# Patient Record
Sex: MALE | Race: WHITE | Employment: OTHER | URBAN - METROPOLITAN AREA
[De-identification: names, ages, dates, MRNs, and addresses within clinical notes are randomized per-mention and may not be internally consistent; named-entity substitution may affect disease eponyms.]

---

## 2019-04-23 ENCOUNTER — HOSPITAL ENCOUNTER (OUTPATIENT)
Dept: PREADMISSION TESTING | Age: 64
Discharge: HOME | End: 2019-04-27
Payer: COMMERCIAL

## 2019-04-23 VITALS
SYSTOLIC BLOOD PRESSURE: 128 MMHG | OXYGEN SATURATION: 94 % | BODY MASS INDEX: 28.12 KG/M2 | DIASTOLIC BLOOD PRESSURE: 68 MMHG | HEART RATE: 80 BPM | HEIGHT: 66 IN | WEIGHT: 175 LBS | TEMPERATURE: 98.2 F | RESPIRATION RATE: 18 BRPM

## 2019-04-23 LAB
ANION GAP SERPL CALCULATED.3IONS-SCNC: 12 MMOL/L (ref 9–17)
BUN BLDV-MCNC: 15 MG/DL (ref 8–23)
CHLORIDE BLD-SCNC: 104 MMOL/L (ref 98–107)
CO2: 24 MMOL/L (ref 20–31)
CREAT SERPL-MCNC: 0.64 MG/DL (ref 0.7–1.2)
GFR AFRICAN AMERICAN: >60 ML/MIN
GFR NON-AFRICAN AMERICAN: >60 ML/MIN
GFR SERPL CREATININE-BSD FRML MDRD: ABNORMAL ML/MIN/{1.73_M2}
GFR SERPL CREATININE-BSD FRML MDRD: ABNORMAL ML/MIN/{1.73_M2}
HCT VFR BLD CALC: 50.1 % (ref 40.7–50.3)
HEMOGLOBIN: 16.1 G/DL (ref 13–17)
INR BLD: 1
MCH RBC QN AUTO: 31.4 PG (ref 25.2–33.5)
MCHC RBC AUTO-ENTMCNC: 32.1 G/DL (ref 28.4–34.8)
MCV RBC AUTO: 97.9 FL (ref 82.6–102.9)
NRBC AUTOMATED: 0 PER 100 WBC
PARTIAL THROMBOPLASTIN TIME: 24.2 SEC (ref 20.5–30.5)
PDW BLD-RTO: 11.8 % (ref 11.8–14.4)
PLATELET # BLD: 207 K/UL (ref 138–453)
PMV BLD AUTO: 11.3 FL (ref 8.1–13.5)
POTASSIUM SERPL-SCNC: 4.1 MMOL/L (ref 3.7–5.3)
PROTHROMBIN TIME: 10.6 SEC (ref 9–12)
RBC # BLD: 5.12 M/UL (ref 4.21–5.77)
SODIUM BLD-SCNC: 140 MMOL/L (ref 135–144)
WBC # BLD: 4.5 K/UL (ref 3.5–11.3)

## 2019-04-23 PROCEDURE — 36415 COLL VENOUS BLD VENIPUNCTURE: CPT

## 2019-04-23 PROCEDURE — 87086 URINE CULTURE/COLONY COUNT: CPT

## 2019-04-23 PROCEDURE — 85027 COMPLETE CBC AUTOMATED: CPT

## 2019-04-23 PROCEDURE — 85610 PROTHROMBIN TIME: CPT

## 2019-04-23 PROCEDURE — 82565 ASSAY OF CREATININE: CPT

## 2019-04-23 PROCEDURE — 84520 ASSAY OF UREA NITROGEN: CPT

## 2019-04-23 PROCEDURE — 93005 ELECTROCARDIOGRAM TRACING: CPT

## 2019-04-23 PROCEDURE — 80051 ELECTROLYTE PANEL: CPT

## 2019-04-23 PROCEDURE — 85730 THROMBOPLASTIN TIME PARTIAL: CPT

## 2019-04-23 RX ORDER — TRAMADOL HYDROCHLORIDE 50 MG/1
50 TABLET ORAL EVERY 6 HOURS PRN
Status: ON HOLD | COMMUNITY
End: 2020-09-01 | Stop reason: HOSPADM

## 2019-04-23 RX ORDER — ACETAMINOPHEN 500 MG
1000 TABLET ORAL EVERY 6 HOURS PRN
Status: ON HOLD | COMMUNITY
End: 2020-09-01 | Stop reason: HOSPADM

## 2019-04-23 RX ORDER — CHOLECALCIFEROL (VITAMIN D3) 1250 MCG
1 CAPSULE ORAL
Status: ON HOLD | COMMUNITY
End: 2020-09-01

## 2019-04-23 RX ORDER — TAMSULOSIN HYDROCHLORIDE 0.4 MG/1
0.4 CAPSULE ORAL DAILY
COMMUNITY

## 2019-04-23 RX ORDER — SODIUM CHLORIDE, SODIUM LACTATE, POTASSIUM CHLORIDE, CALCIUM CHLORIDE 600; 310; 30; 20 MG/100ML; MG/100ML; MG/100ML; MG/100ML
1000 INJECTION, SOLUTION INTRAVENOUS CONTINUOUS
Status: CANCELLED | OUTPATIENT
Start: 2019-04-23

## 2019-04-23 RX ORDER — FERROUS SULFATE 325(65) MG
325 TABLET ORAL
COMMUNITY

## 2019-04-23 RX ORDER — BIOTIN 1 MG
5000 TABLET ORAL DAILY
COMMUNITY

## 2019-04-23 ASSESSMENT — PAIN DESCRIPTION - ORIENTATION: ORIENTATION: RIGHT

## 2019-04-23 ASSESSMENT — PAIN DESCRIPTION - PAIN TYPE: TYPE: ACUTE PAIN

## 2019-04-23 ASSESSMENT — PAIN DESCRIPTION - LOCATION: LOCATION: BACK

## 2019-04-23 ASSESSMENT — PAIN DESCRIPTION - DESCRIPTORS: DESCRIPTORS: BURNING

## 2019-04-23 ASSESSMENT — PAIN SCALES - GENERAL: PAINLEVEL_OUTOF10: 1

## 2019-04-23 ASSESSMENT — PAIN DESCRIPTION - ONSET: ONSET: ON-GOING

## 2019-04-23 ASSESSMENT — PAIN DESCRIPTION - FREQUENCY: FREQUENCY: INTERMITTENT

## 2019-04-23 ASSESSMENT — PAIN DESCRIPTION - PROGRESSION: CLINICAL_PROGRESSION: NOT CHANGED

## 2019-04-23 NOTE — H&P
History and Physical    Pt Name: Brittani Harmon  MRN: [id number]  YOB: 1955  Date of evaluation: 4/23/2019  Primary Care Physician: Neftali Enciso MD  Patient evaluated at the request of  Dr. Rika Sanz    Reason for evaluation:   Right renal stones   SUBJECTIVE:   History of Chief Complaint:      Lennie Craig is a 59 y.o. male  Who had a hx of kidney stones that   First started over 15 yrs ago   At present right flank pain with gross hematuria  That he noticed 2 months ago approx  , has drank soda x 35 yrs . Hx of 4 episodes of renal stones he reports through the years . Past Medical History      has a past medical history of Arthritis, Chronic kidney disease, DDD (degenerative disc disease), cervical, Difficulty urinating, Fracture lumbar vertebra-closed ([address] GERD (gastroesophageal reflux disease), Hyperlipidemia, Kidney stone, Palpitations, and Ureteral stone with hydronephrosis. Past Surgical History   has a past surgical history that includes Appendectomy; craniotomy; Lithotripsy; Cystoscopy (10/28/14); Carpal tunnel release (Right); hernia repair; and hernia repair (08/29/2016). Medications   Scheduled Meds:  Current Outpatient Rx   Medication Sig Dispense Refill    traMADol (ULTRAM) 50 MG tablet Take 50 mg by mouth every 6 hours as needed for Pain.       tamsulosin (FLOMAX) 0.4 MG capsule Take 0.4 mg by mouth daily      ferrous sulfate 325 (65 Fe) MG tablet Take 325 mg by mouth daily (with breakfast)      Cholecalciferol (VITAMIN D3) 42210 units CAPS Take 1 capsule by mouth      Biotin 1000 MCG TABS Take 5,000 mcg by mouth daily      acetaminophen (TYLENOL) 500 MG tablet Take 1,000 mg by mouth every 6 hours as needed for Pain      atorvastatin (LIPITOR) 10 MG tablet Take 10 mg by mouth daily      lansoprazole (PREVACID) 15 MG capsule Take 15 mg by mouth daily      Glucosamine-Chondroit-Vit C-Mn (GLUCOSAMINE 1500 COMPLEX PO) Take 1 tablet by mouth daily Not on file        Service:No         Hobbies:   Works out in the [employer]  Daily     OBJECTIVE:   VITALS:  height is 5' 6\" (1.676 m) and weight is 175 lb (79.4 kg). His oral temperature is 98.2 °F (36.8 °C). His blood pressure is 128/68 and his pulse is 80. His respiration is 18 and oxygen saturation is 94%. CONSTITUTIONAL: Alert and oriented times 3, no acute distress and cooperative to examination. friendly and pleasant , muscular athletic build     SKIN: rash No    HEENT: Head is normocephalic, atraumatic. EOMI, PERRLA    Oral air way :slightly narrow Yes    NECK: neck supple, no lymphadenopathy noted, trachea midline and straight       2+ carotid, no bruit    LUNGS: Chest expands equally bilaterally upon respiration, no accessory muscle used. Ausculation reveals no adventitious breath sounds. CARDIOVASCULAR: \"Heart sounds are normal.  Regular rate and rhythm without murmur,    ABDOMEN: Bowel sounds are present in all four quadrants      GENATALIA:Deferred. NEUROLOGIC: \"CN II-XII are grossly intact. EXTREMITIES: Pitting edema:  No,  Varicose veins: No     Dorsal pedal/posterior tibial pulses palpable: Yes         Strength:  Normal       Patient Active Problem List   Diagnosis    Flank pain    Ureteral stone with hydronephrosis    Nausea & vomiting    Umbilical hernia without obstruction and without gangrene    Obstructive uropathy    Chronic midline low back pain               IMPRESSIONS:   1.  right renal stones   2. does not have any pertinent problems on file.     [facility]  Electronically signed 4/23/2019 at 8:51 AM       Scheduled for:    Right kidney stone surgery

## 2019-04-23 NOTE — ANESTHESIA PRE-OP
Anesthesia Focused Assessment    STOP-BANG Sleep Apnea Questionnaire    Obstructive Sleep Apnea:                                                                 No     Machine used:                                                                                  No            SNORE loudly (heard through closed doors)? No      TIRED, fatigued, sleepy during daytime? No      OBSERVED stopping breathing during sleep? No      High blood PRESSURE being treated? No      BMI over 35? No  AGE over 48? Yes  NECK circumference over 16\"? No  GENDER (male)? Yes                High risk 5-8  Intermediate risk 3-4  Low risk 0-2    Total 3  High risk 5-8  Intermediate risk 3-4  Low risk 0-2      Type 1 DM:                                                                                        No    TYPE 2:                                                                                             No    If yes, insulin dependent? No    Coronary Artery Disease :                                                                No        Active smoker                                                                                   No    Drinks Alcohol                                                                                   Yes, occasional     Dentition: Dentures                                                                            No              Partial                                                                                                 No    Any loose or missing teeth                                                                 Yes, missing     Defib / AICD / Pacemaker:                                                               No    Renal Failure: No    If Yes, On dialysis?       Hx of anesthesia complications with Patient                               No    Hx of anesthesia complications with family No    Medical or cardiac clearance ordered:                                         No    Anesthesiologist called:                                                                No    GWYN Collier PA-C  Electronically signed 4/23/2019 at 8:51 AM

## 2019-04-24 LAB
CULTURE: NORMAL
EKG ATRIAL RATE: 59 BPM
EKG P AXIS: 38 DEGREES
EKG P-R INTERVAL: 156 MS
EKG Q-T INTERVAL: 416 MS
EKG QRS DURATION: 92 MS
EKG QTC CALCULATION (BAZETT): 411 MS
EKG R AXIS: 35 DEGREES
EKG T AXIS: 23 DEGREES
EKG VENTRICULAR RATE: 59 BPM
Lab: NORMAL
SPECIMEN DESCRIPTION: NORMAL

## 2019-04-30 ENCOUNTER — ANESTHESIA EVENT (OUTPATIENT)
Dept: OPERATING ROOM | Age: 64
DRG: 661 | End: 2019-04-30
Payer: COMMERCIAL

## 2019-05-01 ENCOUNTER — APPOINTMENT (OUTPATIENT)
Dept: GENERAL RADIOLOGY | Age: 64
DRG: 661 | End: 2019-05-01
Attending: UROLOGY
Payer: COMMERCIAL

## 2019-05-01 ENCOUNTER — ANESTHESIA (OUTPATIENT)
Dept: INTERVENTIONAL RADIOLOGY/VASCULAR | Age: 64
DRG: 661 | End: 2019-05-01
Payer: COMMERCIAL

## 2019-05-01 ENCOUNTER — ANESTHESIA EVENT (OUTPATIENT)
Dept: OPERATING ROOM | Age: 64
DRG: 661 | End: 2019-05-01
Payer: COMMERCIAL

## 2019-05-01 ENCOUNTER — ANESTHESIA (OUTPATIENT)
Dept: OPERATING ROOM | Age: 64
DRG: 661 | End: 2019-05-01
Payer: COMMERCIAL

## 2019-05-01 ENCOUNTER — ANESTHESIA EVENT (OUTPATIENT)
Dept: INTERVENTIONAL RADIOLOGY/VASCULAR | Age: 64
DRG: 661 | End: 2019-05-01
Payer: COMMERCIAL

## 2019-05-01 ENCOUNTER — HOSPITAL ENCOUNTER (OUTPATIENT)
Dept: INTERVENTIONAL RADIOLOGY/VASCULAR | Age: 64
Setting detail: OUTPATIENT SURGERY
Discharge: HOME | DRG: 661 | End: 2019-05-03
Attending: UROLOGY
Payer: COMMERCIAL

## 2019-05-01 ENCOUNTER — HOSPITAL ENCOUNTER (INPATIENT)
Age: 64
LOS: 1 days | Discharge: HOME | DRG: 661 | End: 2019-05-04
Attending: UROLOGY | Admitting: UROLOGY
Payer: COMMERCIAL

## 2019-05-01 VITALS
RESPIRATION RATE: 17 BRPM | DIASTOLIC BLOOD PRESSURE: 49 MMHG | OXYGEN SATURATION: 100 % | SYSTOLIC BLOOD PRESSURE: 125 MMHG

## 2019-05-01 VITALS
RESPIRATION RATE: 19 BRPM | OXYGEN SATURATION: 98 % | SYSTOLIC BLOOD PRESSURE: 154 MMHG | HEART RATE: 73 BPM | DIASTOLIC BLOOD PRESSURE: 64 MMHG | TEMPERATURE: 97.9 F

## 2019-05-01 VITALS
SYSTOLIC BLOOD PRESSURE: 99 MMHG | RESPIRATION RATE: 11 BRPM | DIASTOLIC BLOOD PRESSURE: 53 MMHG | OXYGEN SATURATION: 99 %

## 2019-05-01 VITALS — TEMPERATURE: 97.7 F | OXYGEN SATURATION: 98 % | DIASTOLIC BLOOD PRESSURE: 71 MMHG | SYSTOLIC BLOOD PRESSURE: 131 MMHG

## 2019-05-01 DIAGNOSIS — N20.0 KIDNEY STONES: ICD-10-CM

## 2019-05-01 DIAGNOSIS — G89.18 POST-OP PAIN: Primary | ICD-10-CM

## 2019-05-01 LAB
HCT VFR BLD CALC: 45.1 % (ref 40.7–50.3)
HEMOGLOBIN: 15.2 G/DL (ref 13–17)
MCH RBC QN AUTO: 32.7 PG (ref 25.2–33.5)
MCHC RBC AUTO-ENTMCNC: 33.7 G/DL (ref 28.4–34.8)
MCV RBC AUTO: 97 FL (ref 82.6–102.9)
NRBC AUTOMATED: 0 PER 100 WBC
PDW BLD-RTO: 11.4 % (ref 11.8–14.4)
PLATELET # BLD: 204 K/UL (ref 138–453)
PMV BLD AUTO: 10.8 FL (ref 8.1–13.5)
RBC # BLD: 4.65 M/UL (ref 4.21–5.77)
WBC # BLD: 10.8 K/UL (ref 3.5–11.3)

## 2019-05-01 PROCEDURE — 6360000002 HC RX W HCPCS

## 2019-05-01 PROCEDURE — 0TC03ZZ EXTIRPATION OF MATTER FROM RIGHT KIDNEY, PERCUTANEOUS APPROACH: ICD-10-PCS | Performed by: UROLOGY

## 2019-05-01 PROCEDURE — 6370000000 HC RX 637 (ALT 250 FOR IP): Performed by: STUDENT IN AN ORGANIZED HEALTH CARE EDUCATION/TRAINING PROGRAM

## 2019-05-01 PROCEDURE — 3700000000 HC ANESTHESIA ATTENDED CARE: Performed by: UROLOGY

## 2019-05-01 PROCEDURE — 6360000002 HC RX W HCPCS: Performed by: RADIOLOGY

## 2019-05-01 PROCEDURE — 6360000002 HC RX W HCPCS: Performed by: ANESTHESIOLOGY

## 2019-05-01 PROCEDURE — 50433 PLMT NEPHROURETERAL CATHETER: CPT | Performed by: RADIOLOGY

## 2019-05-01 PROCEDURE — 2709999900 HC NON-CHARGEABLE SUPPLY

## 2019-05-01 PROCEDURE — 3700000001 HC ADD 15 MINUTES (ANESTHESIA)

## 2019-05-01 PROCEDURE — C1769 GUIDE WIRE: HCPCS | Performed by: UROLOGY

## 2019-05-01 PROCEDURE — G0378 HOSPITAL OBSERVATION PER HR: HCPCS

## 2019-05-01 PROCEDURE — 3600000004 HC SURGERY LEVEL 4 BASE: Performed by: UROLOGY

## 2019-05-01 PROCEDURE — 6360000002 HC RX W HCPCS: Performed by: NURSE ANESTHETIST, CERTIFIED REGISTERED

## 2019-05-01 PROCEDURE — 3600000002 HC SURGERY LEVEL 2 BASE: Performed by: UROLOGY

## 2019-05-01 PROCEDURE — 3600000012 HC SURGERY LEVEL 2 ADDTL 15MIN: Performed by: UROLOGY

## 2019-05-01 PROCEDURE — 7100000000 HC PACU RECOVERY - FIRST 15 MIN: Performed by: UROLOGY

## 2019-05-01 PROCEDURE — 6360000004 HC RX CONTRAST MEDICATION: Performed by: UROLOGY

## 2019-05-01 PROCEDURE — 3600000014 HC SURGERY LEVEL 4 ADDTL 15MIN: Performed by: UROLOGY

## 2019-05-01 PROCEDURE — 2500000003 HC RX 250 WO HCPCS: Performed by: NURSE ANESTHETIST, CERTIFIED REGISTERED

## 2019-05-01 PROCEDURE — 2580000003 HC RX 258: Performed by: ANESTHESIOLOGY

## 2019-05-01 PROCEDURE — 7100000010 HC PHASE II RECOVERY - FIRST 15 MIN

## 2019-05-01 PROCEDURE — 6360000002 HC RX W HCPCS: Performed by: PHYSICIAN ASSISTANT

## 2019-05-01 PROCEDURE — 2500000003 HC RX 250 WO HCPCS

## 2019-05-01 PROCEDURE — 74018 RADEX ABDOMEN 1 VIEW: CPT

## 2019-05-01 PROCEDURE — 2580000003 HC RX 258: Performed by: UROLOGY

## 2019-05-01 PROCEDURE — 0TL68DZ OCCLUSION OF RIGHT URETER WITH INTRALUMINAL DEVICE, VIA NATURAL OR ARTIFICIAL OPENING ENDOSCOPIC: ICD-10-PCS | Performed by: UROLOGY

## 2019-05-01 PROCEDURE — 3700000001 HC ADD 15 MINUTES (ANESTHESIA): Performed by: UROLOGY

## 2019-05-01 PROCEDURE — C1769 GUIDE WIRE: HCPCS

## 2019-05-01 PROCEDURE — C1726 CATH, BAL DIL, NON-VASCULAR: HCPCS | Performed by: UROLOGY

## 2019-05-01 PROCEDURE — 2580000003 HC RX 258

## 2019-05-01 PROCEDURE — 2709999900 HC NON-CHARGEABLE SUPPLY: Performed by: UROLOGY

## 2019-05-01 PROCEDURE — 6360000002 HC RX W HCPCS: Performed by: STUDENT IN AN ORGANIZED HEALTH CARE EDUCATION/TRAINING PROGRAM

## 2019-05-01 PROCEDURE — 2580000003 HC RX 258: Performed by: PHYSICIAN ASSISTANT

## 2019-05-01 PROCEDURE — BT1D0ZZ FLUOROSCOPY OF RIGHT KIDNEY, URETER AND BLADDER USING HIGH OSMOLAR CONTRAST: ICD-10-PCS | Performed by: UROLOGY

## 2019-05-01 PROCEDURE — 3700000000 HC ANESTHESIA ATTENDED CARE

## 2019-05-01 PROCEDURE — 7100000001 HC PACU RECOVERY - ADDTL 15 MIN: Performed by: UROLOGY

## 2019-05-01 PROCEDURE — C2628 CATHETER, OCCLUSION: HCPCS | Performed by: UROLOGY

## 2019-05-01 PROCEDURE — 7100000011 HC PHASE II RECOVERY - ADDTL 15 MIN

## 2019-05-01 PROCEDURE — 85027 COMPLETE CBC AUTOMATED: CPT

## 2019-05-01 PROCEDURE — 82365 CALCULUS SPECTROSCOPY: CPT

## 2019-05-01 RX ORDER — MIDAZOLAM HYDROCHLORIDE 1 MG/ML
INJECTION INTRAMUSCULAR; INTRAVENOUS PRN
Status: DISCONTINUED | OUTPATIENT
Start: 2019-05-01 | End: 2019-05-01 | Stop reason: SDUPTHER

## 2019-05-01 RX ORDER — OXYCODONE HYDROCHLORIDE AND ACETAMINOPHEN 5; 325 MG/1; MG/1
1 TABLET ORAL EVERY 4 HOURS PRN
Status: DISCONTINUED | OUTPATIENT
Start: 2019-05-01 | End: 2019-05-01

## 2019-05-01 RX ORDER — SODIUM CHLORIDE 0.9 % (FLUSH) 0.9 %
10 SYRINGE (ML) INJECTION PRN
Status: DISCONTINUED | OUTPATIENT
Start: 2019-05-01 | End: 2019-05-04 | Stop reason: HOSPADM

## 2019-05-01 RX ORDER — PANTOPRAZOLE SODIUM 20 MG/1
20 TABLET, DELAYED RELEASE ORAL
Status: DISCONTINUED | OUTPATIENT
Start: 2019-05-02 | End: 2019-05-04 | Stop reason: HOSPADM

## 2019-05-01 RX ORDER — ONDANSETRON 2 MG/ML
4 INJECTION INTRAMUSCULAR; INTRAVENOUS EVERY 6 HOURS PRN
Status: DISCONTINUED | OUTPATIENT
Start: 2019-05-01 | End: 2019-05-04 | Stop reason: HOSPADM

## 2019-05-01 RX ORDER — PROPOFOL 10 MG/ML
INJECTION, EMULSION INTRAVENOUS CONTINUOUS PRN
Status: DISCONTINUED | OUTPATIENT
Start: 2019-05-01 | End: 2019-05-01 | Stop reason: SDUPTHER

## 2019-05-01 RX ORDER — MAGNESIUM HYDROXIDE 1200 MG/15ML
LIQUID ORAL PRN
Status: DISCONTINUED | OUTPATIENT
Start: 2019-05-01 | End: 2019-05-01 | Stop reason: ALTCHOICE

## 2019-05-01 RX ORDER — MIDAZOLAM HYDROCHLORIDE 1 MG/ML
1 INJECTION INTRAMUSCULAR; INTRAVENOUS ONCE
Status: COMPLETED | OUTPATIENT
Start: 2019-05-01 | End: 2019-05-01

## 2019-05-01 RX ORDER — LABETALOL HYDROCHLORIDE 5 MG/ML
5 INJECTION, SOLUTION INTRAVENOUS EVERY 10 MIN PRN
Status: DISCONTINUED | OUTPATIENT
Start: 2019-05-01 | End: 2019-05-01 | Stop reason: HOSPADM

## 2019-05-01 RX ORDER — DOCUSATE SODIUM 100 MG/1
100 CAPSULE, LIQUID FILLED ORAL 2 TIMES DAILY PRN
Status: DISCONTINUED | OUTPATIENT
Start: 2019-05-01 | End: 2019-05-02

## 2019-05-01 RX ORDER — MORPHINE SULFATE 2 MG/ML
2 INJECTION, SOLUTION INTRAMUSCULAR; INTRAVENOUS EVERY 4 HOURS PRN
Status: DISCONTINUED | OUTPATIENT
Start: 2019-05-01 | End: 2019-05-01

## 2019-05-01 RX ORDER — HYDROCODONE BITARTRATE AND ACETAMINOPHEN 5; 325 MG/1; MG/1
2 TABLET ORAL EVERY 6 HOURS PRN
Status: DISCONTINUED | OUTPATIENT
Start: 2019-05-01 | End: 2019-05-03

## 2019-05-01 RX ORDER — SODIUM CHLORIDE 9 MG/ML
INJECTION, SOLUTION INTRAVENOUS CONTINUOUS
Status: DISCONTINUED | OUTPATIENT
Start: 2019-05-01 | End: 2019-05-04 | Stop reason: HOSPADM

## 2019-05-01 RX ORDER — FENTANYL CITRATE 50 UG/ML
50 INJECTION, SOLUTION INTRAMUSCULAR; INTRAVENOUS ONCE
Status: COMPLETED | OUTPATIENT
Start: 2019-05-01 | End: 2019-05-01

## 2019-05-01 RX ORDER — MIDAZOLAM HYDROCHLORIDE 1 MG/ML
INJECTION INTRAMUSCULAR; INTRAVENOUS
Status: DISCONTINUED
Start: 2019-05-01 | End: 2019-05-01

## 2019-05-01 RX ORDER — POLYETHYLENE GLYCOL 3350 17 G/17G
17 POWDER, FOR SOLUTION ORAL DAILY PRN
Status: DISCONTINUED | OUTPATIENT
Start: 2019-05-01 | End: 2019-05-04 | Stop reason: HOSPADM

## 2019-05-01 RX ORDER — LIDOCAINE HYDROCHLORIDE 10 MG/ML
INJECTION, SOLUTION EPIDURAL; INFILTRATION; INTRACAUDAL; PERINEURAL PRN
Status: DISCONTINUED | OUTPATIENT
Start: 2019-05-01 | End: 2019-05-01 | Stop reason: SDUPTHER

## 2019-05-01 RX ORDER — FENTANYL CITRATE 50 UG/ML
INJECTION, SOLUTION INTRAMUSCULAR; INTRAVENOUS PRN
Status: DISCONTINUED | OUTPATIENT
Start: 2019-05-01 | End: 2019-05-01 | Stop reason: SDUPTHER

## 2019-05-01 RX ORDER — CIPROFLOXACIN 2 MG/ML
400 INJECTION, SOLUTION INTRAVENOUS ONCE
Status: COMPLETED | OUTPATIENT
Start: 2019-05-01 | End: 2019-05-01

## 2019-05-01 RX ORDER — MORPHINE SULFATE 2 MG/ML
2 INJECTION, SOLUTION INTRAMUSCULAR; INTRAVENOUS EVERY 5 MIN PRN
Status: DISCONTINUED | OUTPATIENT
Start: 2019-05-01 | End: 2019-05-01 | Stop reason: HOSPADM

## 2019-05-01 RX ORDER — ROCURONIUM BROMIDE 10 MG/ML
INJECTION, SOLUTION INTRAVENOUS PRN
Status: DISCONTINUED | OUTPATIENT
Start: 2019-05-01 | End: 2019-05-01 | Stop reason: SDUPTHER

## 2019-05-01 RX ORDER — MORPHINE SULFATE 4 MG/ML
4 INJECTION, SOLUTION INTRAMUSCULAR; INTRAVENOUS
Status: DISCONTINUED | OUTPATIENT
Start: 2019-05-01 | End: 2019-05-01

## 2019-05-01 RX ORDER — FENTANYL CITRATE 50 UG/ML
50 INJECTION, SOLUTION INTRAMUSCULAR; INTRAVENOUS EVERY 5 MIN PRN
Status: COMPLETED | OUTPATIENT
Start: 2019-05-01 | End: 2019-05-01

## 2019-05-01 RX ORDER — SODIUM CHLORIDE 9 MG/ML
INJECTION, SOLUTION INTRAVENOUS CONTINUOUS
Status: DISCONTINUED | OUTPATIENT
Start: 2019-05-01 | End: 2019-05-01

## 2019-05-01 RX ORDER — ONDANSETRON 2 MG/ML
4 INJECTION INTRAMUSCULAR; INTRAVENOUS
Status: COMPLETED | OUTPATIENT
Start: 2019-05-01 | End: 2019-05-01

## 2019-05-01 RX ORDER — MAGNESIUM HYDROXIDE 1200 MG/15ML
LIQUID ORAL CONTINUOUS PRN
Status: COMPLETED | OUTPATIENT
Start: 2019-05-01 | End: 2019-05-01

## 2019-05-01 RX ORDER — FENTANYL CITRATE 50 UG/ML
25 INJECTION, SOLUTION INTRAMUSCULAR; INTRAVENOUS
Status: DISCONTINUED | OUTPATIENT
Start: 2019-05-01 | End: 2019-05-02

## 2019-05-01 RX ORDER — FENTANYL CITRATE 50 UG/ML
25 INJECTION, SOLUTION INTRAMUSCULAR; INTRAVENOUS EVERY 5 MIN PRN
Status: COMPLETED | OUTPATIENT
Start: 2019-05-01 | End: 2019-05-01

## 2019-05-01 RX ORDER — OXYCODONE HYDROCHLORIDE AND ACETAMINOPHEN 5; 325 MG/1; MG/1
2 TABLET ORAL EVERY 4 HOURS PRN
Status: DISCONTINUED | OUTPATIENT
Start: 2019-05-01 | End: 2019-05-01

## 2019-05-01 RX ORDER — ONDANSETRON 2 MG/ML
4 INJECTION INTRAMUSCULAR; INTRAVENOUS
Status: DISCONTINUED | OUTPATIENT
Start: 2019-05-01 | End: 2019-05-01 | Stop reason: HOSPADM

## 2019-05-01 RX ORDER — PROPOFOL 10 MG/ML
INJECTION, EMULSION INTRAVENOUS PRN
Status: DISCONTINUED | OUTPATIENT
Start: 2019-05-01 | End: 2019-05-01 | Stop reason: SDUPTHER

## 2019-05-01 RX ORDER — ONDANSETRON 2 MG/ML
INJECTION INTRAMUSCULAR; INTRAVENOUS PRN
Status: DISCONTINUED | OUTPATIENT
Start: 2019-05-01 | End: 2019-05-01 | Stop reason: SDUPTHER

## 2019-05-01 RX ORDER — SODIUM CHLORIDE 0.9 % (FLUSH) 0.9 %
10 SYRINGE (ML) INJECTION EVERY 12 HOURS SCHEDULED
Status: DISCONTINUED | OUTPATIENT
Start: 2019-05-01 | End: 2019-05-04 | Stop reason: HOSPADM

## 2019-05-01 RX ORDER — DEXAMETHASONE SODIUM PHOSPHATE 10 MG/ML
INJECTION INTRAMUSCULAR; INTRAVENOUS PRN
Status: DISCONTINUED | OUTPATIENT
Start: 2019-05-01 | End: 2019-05-01 | Stop reason: SDUPTHER

## 2019-05-01 RX ORDER — MEPERIDINE HYDROCHLORIDE 50 MG/ML
12.5 INJECTION INTRAMUSCULAR; INTRAVENOUS; SUBCUTANEOUS EVERY 5 MIN PRN
Status: DISCONTINUED | OUTPATIENT
Start: 2019-05-01 | End: 2019-05-04 | Stop reason: HOSPADM

## 2019-05-01 RX ORDER — NEOSTIGMINE METHYLSULFATE 5 MG/5 ML
SYRINGE (ML) INTRAVENOUS PRN
Status: DISCONTINUED | OUTPATIENT
Start: 2019-05-01 | End: 2019-05-01 | Stop reason: SDUPTHER

## 2019-05-01 RX ORDER — MIDAZOLAM HYDROCHLORIDE 1 MG/ML
1 INJECTION INTRAMUSCULAR; INTRAVENOUS
Status: DISPENSED | OUTPATIENT
Start: 2019-05-01 | End: 2019-05-01

## 2019-05-01 RX ORDER — GLYCOPYRROLATE 1 MG/5 ML
SYRINGE (ML) INTRAVENOUS PRN
Status: DISCONTINUED | OUTPATIENT
Start: 2019-05-01 | End: 2019-05-01 | Stop reason: SDUPTHER

## 2019-05-01 RX ORDER — ATORVASTATIN CALCIUM 10 MG/1
10 TABLET, FILM COATED ORAL DAILY
Status: DISCONTINUED | OUTPATIENT
Start: 2019-05-01 | End: 2019-05-02

## 2019-05-01 RX ORDER — ATROPA BELLADONNA AND OPIUM 16.2; 3 MG/1; MG/1
30 SUPPOSITORY RECTAL EVERY 8 HOURS PRN
Status: DISCONTINUED | OUTPATIENT
Start: 2019-05-01 | End: 2019-05-04 | Stop reason: HOSPADM

## 2019-05-01 RX ORDER — HYDROCODONE BITARTRATE AND ACETAMINOPHEN 5; 325 MG/1; MG/1
1 TABLET ORAL EVERY 6 HOURS PRN
Status: DISCONTINUED | OUTPATIENT
Start: 2019-05-01 | End: 2019-05-03

## 2019-05-01 RX ORDER — SODIUM CHLORIDE, SODIUM LACTATE, POTASSIUM CHLORIDE, CALCIUM CHLORIDE 600; 310; 30; 20 MG/100ML; MG/100ML; MG/100ML; MG/100ML
1000 INJECTION, SOLUTION INTRAVENOUS CONTINUOUS
Status: DISCONTINUED | OUTPATIENT
Start: 2019-05-01 | End: 2019-05-01

## 2019-05-01 RX ORDER — FENTANYL CITRATE 50 UG/ML
50 INJECTION, SOLUTION INTRAMUSCULAR; INTRAVENOUS EVERY 5 MIN PRN
Status: DISCONTINUED | OUTPATIENT
Start: 2019-05-01 | End: 2019-05-01 | Stop reason: SDUPTHER

## 2019-05-01 RX ORDER — SENNA PLUS 8.6 MG/1
1 TABLET ORAL NIGHTLY
Status: DISCONTINUED | OUTPATIENT
Start: 2019-05-01 | End: 2019-05-04 | Stop reason: HOSPADM

## 2019-05-01 RX ORDER — TAMSULOSIN HYDROCHLORIDE 0.4 MG/1
0.4 CAPSULE ORAL DAILY
Status: DISCONTINUED | OUTPATIENT
Start: 2019-05-01 | End: 2019-05-02

## 2019-05-01 RX ORDER — ACETAMINOPHEN 325 MG/1
650 TABLET ORAL EVERY 4 HOURS PRN
Status: DISCONTINUED | OUTPATIENT
Start: 2019-05-01 | End: 2019-05-04 | Stop reason: HOSPADM

## 2019-05-01 RX ORDER — LIDOCAINE HYDROCHLORIDE 10 MG/ML
INJECTION, SOLUTION INFILTRATION; PERINEURAL PRN
Status: DISCONTINUED | OUTPATIENT
Start: 2019-05-01 | End: 2019-05-01 | Stop reason: SDUPTHER

## 2019-05-01 RX ORDER — SODIUM CHLORIDE, SODIUM LACTATE, POTASSIUM CHLORIDE, CALCIUM CHLORIDE 600; 310; 30; 20 MG/100ML; MG/100ML; MG/100ML; MG/100ML
INJECTION, SOLUTION INTRAVENOUS CONTINUOUS PRN
Status: DISCONTINUED | OUTPATIENT
Start: 2019-05-01 | End: 2019-05-01 | Stop reason: SDUPTHER

## 2019-05-01 RX ADMIN — FENTANYL CITRATE 50 MCG: 50 INJECTION, SOLUTION INTRAMUSCULAR; INTRAVENOUS at 10:38

## 2019-05-01 RX ADMIN — SODIUM CHLORIDE: 9 INJECTION, SOLUTION INTRAVENOUS at 18:58

## 2019-05-01 RX ADMIN — Medication 2 G: at 22:02

## 2019-05-01 RX ADMIN — PROPOFOL 200 MG: 10 INJECTION, EMULSION INTRAVENOUS at 14:36

## 2019-05-01 RX ADMIN — ROCURONIUM BROMIDE 50 MG: 10 INJECTION INTRAVENOUS at 14:36

## 2019-05-01 RX ADMIN — FENTANYL CITRATE 50 MCG: 50 INJECTION, SOLUTION INTRAMUSCULAR; INTRAVENOUS at 11:35

## 2019-05-01 RX ADMIN — ONDANSETRON 4 MG: 2 INJECTION, SOLUTION INTRAMUSCULAR; INTRAVENOUS at 15:33

## 2019-05-01 RX ADMIN — SODIUM CHLORIDE, POTASSIUM CHLORIDE, SODIUM LACTATE AND CALCIUM CHLORIDE 1000 ML: 600; 310; 30; 20 INJECTION, SOLUTION INTRAVENOUS at 07:48

## 2019-05-01 RX ADMIN — FENTANYL CITRATE 50 MCG: 50 INJECTION, SOLUTION INTRAMUSCULAR; INTRAVENOUS at 13:15

## 2019-05-01 RX ADMIN — FENTANYL CITRATE 100 MCG: 50 INJECTION, SOLUTION INTRAMUSCULAR; INTRAVENOUS at 14:36

## 2019-05-01 RX ADMIN — MIDAZOLAM HYDROCHLORIDE 1 MG: 1 INJECTION, SOLUTION INTRAMUSCULAR; INTRAVENOUS at 14:16

## 2019-05-01 RX ADMIN — LIDOCAINE HYDROCHLORIDE 50 MG: 10 INJECTION, SOLUTION INFILTRATION; PERINEURAL at 09:02

## 2019-05-01 RX ADMIN — MIDAZOLAM HYDROCHLORIDE 1 MG: 1 INJECTION, SOLUTION INTRAMUSCULAR; INTRAVENOUS at 11:34

## 2019-05-01 RX ADMIN — FENTANYL CITRATE 50 MCG: 50 INJECTION INTRAMUSCULAR; INTRAVENOUS at 09:01

## 2019-05-01 RX ADMIN — Medication 2 G: at 09:12

## 2019-05-01 RX ADMIN — MIDAZOLAM HYDROCHLORIDE 2 MG: 1 INJECTION, SOLUTION INTRAMUSCULAR; INTRAVENOUS at 08:57

## 2019-05-01 RX ADMIN — PROPOFOL 30 MG: 10 INJECTION, EMULSION INTRAVENOUS at 09:47

## 2019-05-01 RX ADMIN — FENTANYL CITRATE 25 MCG: 50 INJECTION, SOLUTION INTRAMUSCULAR; INTRAVENOUS at 16:51

## 2019-05-01 RX ADMIN — PROPOFOL 150 MCG/KG/MIN: 10 INJECTION, EMULSION INTRAVENOUS at 09:02

## 2019-05-01 RX ADMIN — ONDANSETRON 4 MG: 2 INJECTION INTRAMUSCULAR; INTRAVENOUS at 10:37

## 2019-05-01 RX ADMIN — PROPOFOL 80 MG: 10 INJECTION, EMULSION INTRAVENOUS at 09:02

## 2019-05-01 RX ADMIN — HYDROMORPHONE HYDROCHLORIDE 0.5 MG: 1 INJECTION, SOLUTION INTRAMUSCULAR; INTRAVENOUS; SUBCUTANEOUS at 17:32

## 2019-05-01 RX ADMIN — MIDAZOLAM HYDROCHLORIDE 1 MG: 1 INJECTION, SOLUTION INTRAMUSCULAR; INTRAVENOUS at 13:10

## 2019-05-01 RX ADMIN — FENTANYL CITRATE 25 MCG: 50 INJECTION, SOLUTION INTRAMUSCULAR; INTRAVENOUS at 17:00

## 2019-05-01 RX ADMIN — FENTANYL CITRATE 25 MCG: 50 INJECTION, SOLUTION INTRAMUSCULAR; INTRAVENOUS at 11:10

## 2019-05-01 RX ADMIN — FENTANYL CITRATE 150 MCG: 50 INJECTION, SOLUTION INTRAMUSCULAR; INTRAVENOUS at 14:40

## 2019-05-01 RX ADMIN — FENTANYL CITRATE 50 MCG: 50 INJECTION, SOLUTION INTRAMUSCULAR; INTRAVENOUS at 14:20

## 2019-05-01 RX ADMIN — FENTANYL CITRATE 50 MCG: 50 INJECTION, SOLUTION INTRAMUSCULAR; INTRAVENOUS at 10:46

## 2019-05-01 RX ADMIN — FENTANYL CITRATE 50 MCG: 50 INJECTION, SOLUTION INTRAMUSCULAR; INTRAVENOUS at 16:31

## 2019-05-01 RX ADMIN — Medication 0.4 MG: at 15:33

## 2019-05-01 RX ADMIN — HYDROMORPHONE HYDROCHLORIDE 0.5 MG: 1 INJECTION, SOLUTION INTRAMUSCULAR; INTRAVENOUS; SUBCUTANEOUS at 17:26

## 2019-05-01 RX ADMIN — FENTANYL CITRATE 50 MCG: 50 INJECTION INTRAMUSCULAR; INTRAVENOUS at 09:50

## 2019-05-01 RX ADMIN — FENTANYL CITRATE 25 MCG: 50 INJECTION, SOLUTION INTRAMUSCULAR; INTRAVENOUS at 11:00

## 2019-05-01 RX ADMIN — DEXAMETHASONE SODIUM PHOSPHATE 10 MG: 10 INJECTION INTRAMUSCULAR; INTRAVENOUS at 14:58

## 2019-05-01 RX ADMIN — FENTANYL CITRATE 50 MCG: 50 INJECTION, SOLUTION INTRAMUSCULAR; INTRAVENOUS at 12:08

## 2019-05-01 RX ADMIN — SODIUM CHLORIDE, POTASSIUM CHLORIDE, SODIUM LACTATE AND CALCIUM CHLORIDE: 600; 310; 30; 20 INJECTION, SOLUTION INTRAVENOUS at 08:57

## 2019-05-01 RX ADMIN — HYDROMORPHONE HYDROCHLORIDE 0.5 MG: 1 INJECTION, SOLUTION INTRAMUSCULAR; INTRAVENOUS; SUBCUTANEOUS at 17:20

## 2019-05-01 RX ADMIN — ONDANSETRON 4 MG: 2 INJECTION INTRAMUSCULAR; INTRAVENOUS at 19:03

## 2019-05-01 RX ADMIN — PROPOFOL 100 MCG/KG/MIN: 10 INJECTION, EMULSION INTRAVENOUS at 09:41

## 2019-05-01 RX ADMIN — CIPROFLOXACIN 400 MG: 2 INJECTION, SOLUTION INTRAVENOUS at 14:45

## 2019-05-01 RX ADMIN — FENTANYL CITRATE 50 MCG: 50 INJECTION, SOLUTION INTRAMUSCULAR; INTRAVENOUS at 14:10

## 2019-05-01 RX ADMIN — IOTHALAMATE MEGLUMINE 10 ML: 430 INJECTION INTRAVASCULAR at 10:37

## 2019-05-01 RX ADMIN — FENTANYL CITRATE 25 MCG: 50 INJECTION, SOLUTION INTRAMUSCULAR; INTRAVENOUS at 11:15

## 2019-05-01 RX ADMIN — FENTANYL CITRATE 50 MCG: 50 INJECTION, SOLUTION INTRAMUSCULAR; INTRAVENOUS at 10:51

## 2019-05-01 RX ADMIN — LIDOCAINE HYDROCHLORIDE 50 MG: 10 INJECTION, SOLUTION EPIDURAL; INFILTRATION; INTRACAUDAL at 09:42

## 2019-05-01 RX ADMIN — FENTANYL CITRATE 25 MCG: 50 INJECTION, SOLUTION INTRAMUSCULAR; INTRAVENOUS at 17:05

## 2019-05-01 RX ADMIN — FENTANYL CITRATE 25 MCG: 50 INJECTION, SOLUTION INTRAMUSCULAR; INTRAVENOUS at 18:58

## 2019-05-01 RX ADMIN — FENTANYL CITRATE 25 MCG: 50 INJECTION, SOLUTION INTRAMUSCULAR; INTRAVENOUS at 11:05

## 2019-05-01 RX ADMIN — FENTANYL CITRATE 50 MCG: 50 INJECTION, SOLUTION INTRAMUSCULAR; INTRAVENOUS at 16:26

## 2019-05-01 RX ADMIN — FENTANYL CITRATE 25 MCG: 50 INJECTION, SOLUTION INTRAMUSCULAR; INTRAVENOUS at 16:46

## 2019-05-01 RX ADMIN — FENTANYL CITRATE 50 MCG: 50 INJECTION, SOLUTION INTRAMUSCULAR; INTRAVENOUS at 10:33

## 2019-05-01 RX ADMIN — Medication 3 MG: at 15:33

## 2019-05-01 RX ADMIN — SODIUM CHLORIDE, POTASSIUM CHLORIDE, SODIUM LACTATE AND CALCIUM CHLORIDE: 600; 310; 30; 20 INJECTION, SOLUTION INTRAVENOUS at 14:23

## 2019-05-01 RX ADMIN — Medication 0.2 MG: at 09:45

## 2019-05-01 RX ADMIN — FENTANYL CITRATE 50 MCG: 50 INJECTION INTRAMUSCULAR; INTRAVENOUS at 09:12

## 2019-05-01 RX ADMIN — HYDROCODONE BITARTRATE AND ACETAMINOPHEN 2 TABLET: 5; 325 TABLET ORAL at 20:42

## 2019-05-01 ASSESSMENT — PAIN SCALES - GENERAL
PAINLEVEL_OUTOF10: 6
PAINLEVEL_OUTOF10: 8
PAINLEVEL_OUTOF10: 6
PAINLEVEL_OUTOF10: 6
PAINLEVEL_OUTOF10: 7
PAINLEVEL_OUTOF10: 7
PAINLEVEL_OUTOF10: 6
PAINLEVEL_OUTOF10: 7
PAINLEVEL_OUTOF10: 6
PAINLEVEL_OUTOF10: 7
PAINLEVEL_OUTOF10: 6
PAINLEVEL_OUTOF10: 6
PAINLEVEL_OUTOF10: 7
PAINLEVEL_OUTOF10: 8
PAINLEVEL_OUTOF10: 5
PAINLEVEL_OUTOF10: 7
PAINLEVEL_OUTOF10: 5
PAINLEVEL_OUTOF10: 5
PAINLEVEL_OUTOF10: 8
PAINLEVEL_OUTOF10: 8
PAINLEVEL_OUTOF10: 7
PAINLEVEL_OUTOF10: 6
PAINLEVEL_OUTOF10: 5
PAINLEVEL_OUTOF10: 5

## 2019-05-01 ASSESSMENT — PULMONARY FUNCTION TESTS
PIF_VALUE: 5
PIF_VALUE: 1
PIF_VALUE: 21
PIF_VALUE: 18
PIF_VALUE: 1
PIF_VALUE: 0
PIF_VALUE: 19
PIF_VALUE: 1
PIF_VALUE: 1
PIF_VALUE: 21
PIF_VALUE: 0
PIF_VALUE: 22
PIF_VALUE: 21
PIF_VALUE: 33
PIF_VALUE: 1
PIF_VALUE: 17
PIF_VALUE: 0
PIF_VALUE: 1
PIF_VALUE: 18
PIF_VALUE: 17
PIF_VALUE: 0
PIF_VALUE: 1
PIF_VALUE: 21
PIF_VALUE: 1
PIF_VALUE: 1
PIF_VALUE: 15
PIF_VALUE: 1
PIF_VALUE: 0
PIF_VALUE: 21
PIF_VALUE: 1
PIF_VALUE: 0
PIF_VALUE: 1
PIF_VALUE: 18
PIF_VALUE: 21
PIF_VALUE: 0
PIF_VALUE: 16
PIF_VALUE: 3
PIF_VALUE: 1
PIF_VALUE: 18
PIF_VALUE: 1
PIF_VALUE: 21
PIF_VALUE: 29
PIF_VALUE: 17
PIF_VALUE: 1
PIF_VALUE: 0
PIF_VALUE: 1
PIF_VALUE: 26
PIF_VALUE: 1
PIF_VALUE: 21
PIF_VALUE: 16
PIF_VALUE: 0
PIF_VALUE: 1
PIF_VALUE: 22
PIF_VALUE: 21
PIF_VALUE: 0
PIF_VALUE: 1
PIF_VALUE: 6
PIF_VALUE: 13
PIF_VALUE: 1
PIF_VALUE: 3
PIF_VALUE: 2
PIF_VALUE: 1
PIF_VALUE: 2
PIF_VALUE: 2
PIF_VALUE: 21
PIF_VALUE: 17
PIF_VALUE: 1
PIF_VALUE: 2
PIF_VALUE: 1
PIF_VALUE: 21
PIF_VALUE: 1
PIF_VALUE: 13
PIF_VALUE: 21
PIF_VALUE: 0
PIF_VALUE: 1
PIF_VALUE: 1
PIF_VALUE: 0
PIF_VALUE: 1
PIF_VALUE: 3
PIF_VALUE: 1
PIF_VALUE: 17
PIF_VALUE: 18
PIF_VALUE: 0
PIF_VALUE: 1
PIF_VALUE: 0
PIF_VALUE: 18
PIF_VALUE: 1
PIF_VALUE: 1
PIF_VALUE: 17
PIF_VALUE: 1
PIF_VALUE: 1
PIF_VALUE: 17
PIF_VALUE: 1
PIF_VALUE: 0
PIF_VALUE: 1
PIF_VALUE: 17
PIF_VALUE: 13
PIF_VALUE: 2
PIF_VALUE: 1
PIF_VALUE: 22
PIF_VALUE: 21
PIF_VALUE: 1
PIF_VALUE: 30
PIF_VALUE: 17
PIF_VALUE: 13
PIF_VALUE: 21
PIF_VALUE: 4
PIF_VALUE: 21
PIF_VALUE: 17
PIF_VALUE: 17
PIF_VALUE: 2
PIF_VALUE: 26
PIF_VALUE: 18
PIF_VALUE: 17
PIF_VALUE: 17
PIF_VALUE: 16
PIF_VALUE: 21
PIF_VALUE: 21
PIF_VALUE: 1
PIF_VALUE: 1
PIF_VALUE: 21
PIF_VALUE: 1
PIF_VALUE: 1
PIF_VALUE: 17
PIF_VALUE: 0
PIF_VALUE: 21
PIF_VALUE: 21
PIF_VALUE: 0
PIF_VALUE: 21
PIF_VALUE: 2
PIF_VALUE: 1
PIF_VALUE: 1
PIF_VALUE: 22
PIF_VALUE: 17
PIF_VALUE: 1
PIF_VALUE: 21
PIF_VALUE: 22
PIF_VALUE: 1

## 2019-05-01 ASSESSMENT — PAIN DESCRIPTION - PROGRESSION
CLINICAL_PROGRESSION: RAPIDLY WORSENING
CLINICAL_PROGRESSION: GRADUALLY WORSENING
CLINICAL_PROGRESSION: GRADUALLY IMPROVING
CLINICAL_PROGRESSION: NOT CHANGED
CLINICAL_PROGRESSION: GRADUALLY WORSENING
CLINICAL_PROGRESSION: RAPIDLY IMPROVING
CLINICAL_PROGRESSION: RAPIDLY WORSENING
CLINICAL_PROGRESSION: NOT CHANGED

## 2019-05-01 ASSESSMENT — PAIN DESCRIPTION - PAIN TYPE
TYPE: SURGICAL PAIN
TYPE: ACUTE PAIN

## 2019-05-01 ASSESSMENT — PAIN DESCRIPTION - LOCATION
LOCATION: FLANK
LOCATION: BACK
LOCATION: FLANK
LOCATION: BACK
LOCATION: FLANK

## 2019-05-01 ASSESSMENT — PAIN SCALES - WONG BAKER
WONGBAKER_NUMERICALRESPONSE: 0
WONGBAKER_NUMERICALRESPONSE: 10
WONGBAKER_NUMERICALRESPONSE: 0
WONGBAKER_NUMERICALRESPONSE: 0
WONGBAKER_NUMERICALRESPONSE: 10
WONGBAKER_NUMERICALRESPONSE: 2
WONGBAKER_NUMERICALRESPONSE: 0
WONGBAKER_NUMERICALRESPONSE: 0

## 2019-05-01 ASSESSMENT — PAIN DESCRIPTION - ORIENTATION
ORIENTATION: RIGHT

## 2019-05-01 ASSESSMENT — PAIN - FUNCTIONAL ASSESSMENT
PAIN_FUNCTIONAL_ASSESSMENT: 0-10
PAIN_FUNCTIONAL_ASSESSMENT: INTOLERABLE, UNABLE TO DO ANY ACTIVE OR PASSIVE ACTIVITIES
PAIN_FUNCTIONAL_ASSESSMENT: INTOLERABLE, UNABLE TO DO ANY ACTIVE OR PASSIVE ACTIVITIES
PAIN_FUNCTIONAL_ASSESSMENT: PREVENTS OR INTERFERES WITH ALL ACTIVE AND SOME PASSIVE ACTIVITIES
PAIN_FUNCTIONAL_ASSESSMENT: INTOLERABLE, UNABLE TO DO ANY ACTIVE OR PASSIVE ACTIVITIES
PAIN_FUNCTIONAL_ASSESSMENT: PREVENTS OR INTERFERES WITH MANY ACTIVE NOT PASSIVE ACTIVITIES

## 2019-05-01 ASSESSMENT — PAIN DESCRIPTION - ONSET: ONSET: AWAKENED FROM SLEEP

## 2019-05-01 ASSESSMENT — PAIN DESCRIPTION - DESCRIPTORS
DESCRIPTORS: SHARP;OTHER (COMMENT)
DESCRIPTORS: SHARP;SPASM

## 2019-05-01 ASSESSMENT — PAIN DESCRIPTION - FREQUENCY: FREQUENCY: INTERMITTENT

## 2019-05-01 NOTE — ANESTHESIA PRE PROCEDURE
Department of Anesthesiology  Preprocedure Note       Name:  Brigette Ann   Age:  59 y.o.  :  1955                                          MRN:  [id number]         Date:  2019      Surgeon: Tiesha Cervantes):  Sabine Saini MD    Procedure: IR procedure    Medications prior to admission:   Prior to Admission medications    Medication Sig Start Date End Date Taking? Authorizing Provider   traMADol (ULTRAM) 50 MG tablet Take 50 mg by mouth every 6 hours as needed for Pain. Historical Provider, MD   tamsulosin (FLOMAX) 0.4 MG capsule Take 0.4 mg by mouth daily    Historical Provider, MD   ferrous sulfate 325 (65 Fe) MG tablet Take 325 mg by mouth daily (with breakfast)    Historical Provider, MD   Cholecalciferol (VITAMIN D3) 08390 units CAPS Take 1 capsule by mouth    Historical Provider, MD   Biotin 1000 MCG TABS Take 5,000 mcg by mouth daily    Historical Provider, MD   acetaminophen (TYLENOL) 500 MG tablet Take 1,000 mg by mouth every 6 hours as needed for Pain    Historical Provider, MD   atorvastatin (LIPITOR) 10 MG tablet Take 10 mg by mouth daily    Historical Provider, MD   lansoprazole (PREVACID) 15 MG capsule Take 15 mg by mouth daily    Historical Provider, MD   Glucosamine-Chondroit-Vit C-Mn (GLUCOSAMINE 1500 COMPLEX PO) Take 1 tablet by mouth daily    Historical Provider, MD   senna (SENOKOT) 8.6 MG tablet Take 1 tablet by mouth daily    Historical Provider, MD   fluocinonide (LIDEX) 0.05 % cream Apply 1 oz topically nightly as needed Apply topically 2 times daily. Historical Provider, MD       Current medications:    No current facility-administered medications for this visit. No current outpatient medications on file.      Facility-Administered Medications Ordered in Other Visits   Medication Dose Route Frequency Provider Last Rate Last Dose    0.9 % sodium chloride infusion   Intravenous Continuous Inez Galvan MD        ciprofloxacin (CIPRO) IVPB 400 mg  400 mg Intravenous Once Yenny Romero MD        lactated ringers infusion 1,000 mL  1,000 mL Intravenous Continuous Vishal Rodriguez MD 50 mL/hr at 05/01/19 0748 1,000 mL at 05/01/19 0748    ceFAZolin (ANCEF) 2 g in dextrose 5 % 50 mL IVPB  2 g Intravenous Once Olivia Fillers, PA-C           Allergies: Allergies   Allergen Reactions    Nylon      surtures    Other      Nylon sutures-wound does not heal when used    Morphine Nausea And Vomiting       Problem List:    Patient Active Problem List   Diagnosis Code    Flank pain R10.9    Ureteral stone with hydronephrosis N13.2    Nausea & vomiting Y88.5    Umbilical hernia without obstruction and without gangrene K42.9    Obstructive uropathy N13.9    Chronic midline low back pain M54.5, G89.29       Past Medical History:        Diagnosis Date    Arthritis     Chronic kidney disease     DDD (degenerative disc disease), cervical     lumbar    Difficulty urinating     Fracture lumbar vertebra-closed ([address]     chronic fracture to T11 T12    GERD (gastroesophageal reflux disease)     Hyperlipidemia     Kidney stone     Palpitations     Ureteral stone with hydronephrosis 10/27/2014       Past Surgical History:        Procedure Laterality Date    APPENDECTOMY      CARPAL TUNNEL RELEASE Right     7/29/16    CRANIOTOMY      CYSTOSCOPY  10/28/14    ureteroscopy, left ureteral stent placement    HERNIA REPAIR      ingenal, umbilical    HERNIA REPAIR  46/64/1139    umbilical    LITHOTRIPSY         Social History:    Social History     Tobacco Use    Smoking status: Never Smoker    Smokeless tobacco: Never Used   Substance Use Topics    Alcohol use: Yes     Comment: rare                                Counseling given: Not Answered      Vital Signs (Current): There were no vitals filed for this visit.                                            BP Readings from Last 3 Encounters:   05/01/19 (!) 144/80   04/23/19 128/68   08/29/16 157/84       NPO Status: negative vascular ROS. Anesthesia Plan      general and MAC     ASA 2     (Asa 2)  Induction: intravenous. MIPS: Postoperative opioids intended. Anesthetic plan and risks discussed with patient. Plan discussed with CRNA.     Attending anesthesiologist reviewed and agrees with NICOLE Zavala - CRNA   5/1/2019

## 2019-05-01 NOTE — PROGRESS NOTES
1250   Pt had been resting with eyes closed. awkaens with severe pain . Dr Ricardo Soto called. Orders received . [address] called, unsure when pt will be going yet for last part of procedures. Wife remains at bedside.

## 2019-05-01 NOTE — PROGRESS NOTES
1428  Pt to OR per [facility] RN for third part. Wife showed to waiting room and informed of next steps.

## 2019-05-01 NOTE — ANESTHESIA PRE PROCEDURE
Department of Anesthesiology  Preprocedure Note       Name:  Messi Lamas   Age:  59 y.o.  :  1955                                          MRN:  [id number]         Date:  2019      Surgeon: * No surgeons listed *    Procedure: IR procedure, nephrostomy    Medications prior to admission:   Prior to Admission medications    Medication Sig Start Date End Date Taking? Authorizing Provider   traMADol (ULTRAM) 50 MG tablet Take 50 mg by mouth every 6 hours as needed for Pain. Historical Provider, MD   tamsulosin (FLOMAX) 0.4 MG capsule Take 0.4 mg by mouth daily    Historical Provider, MD   ferrous sulfate 325 (65 Fe) MG tablet Take 325 mg by mouth daily (with breakfast)    Historical Provider, MD   Cholecalciferol (VITAMIN D3) 88516 units CAPS Take 1 capsule by mouth    Historical Provider, MD   Biotin 1000 MCG TABS Take 5,000 mcg by mouth daily    Historical Provider, MD   acetaminophen (TYLENOL) 500 MG tablet Take 1,000 mg by mouth every 6 hours as needed for Pain    Historical Provider, MD   atorvastatin (LIPITOR) 10 MG tablet Take 10 mg by mouth daily    Historical Provider, MD   lansoprazole (PREVACID) 15 MG capsule Take 15 mg by mouth daily    Historical Provider, MD   Glucosamine-Chondroit-Vit C-Mn (GLUCOSAMINE 1500 COMPLEX PO) Take 1 tablet by mouth daily    Historical Provider, MD   senna (SENOKOT) 8.6 MG tablet Take 1 tablet by mouth daily    Historical Provider, MD   fluocinonide (LIDEX) 0.05 % cream Apply 1 oz topically nightly as needed Apply topically 2 times daily. Historical Provider, MD       Current medications:    No current facility-administered medications for this encounter. No current outpatient medications on file.      Facility-Administered Medications Ordered in Other Encounters   Medication Dose Route Frequency Provider Last Rate Last Dose    0.9 % sodium chloride infusion   Intravenous Continuous Eric Mccray MD        ciprofloxacin (CIPRO) IVPB 400 mg  400 mg Past Surgical History:        Procedure Laterality Date    APPENDECTOMY      CARPAL TUNNEL RELEASE Right     7/29/16    CRANIOTOMY      CYSTOSCOPY  10/28/14    ureteroscopy, left ureteral stent placement    HERNIA REPAIR      ingenal, umbilical    HERNIA REPAIR  35/24/2933    umbilical    LITHOTRIPSY         Social History:    Social History     Tobacco Use    Smoking status: Never Smoker    Smokeless tobacco: Never Used   Substance Use Topics    Alcohol use: Yes     Comment: rare                                Counseling given: Not Answered      Vital Signs (Current): There were no vitals filed for this visit. BP Readings from Last 3 Encounters:   05/01/19 (!) 144/80   05/01/19 139/64   04/23/19 128/68       NPO Status:                                                                                 BMI:   Wt Readings from Last 3 Encounters:   05/01/19 170 lb (77.1 kg)   04/23/19 175 lb (79.4 kg)   08/29/16 195 lb (88.5 kg)     There is no height or weight on file to calculate BMI.    CBC:   Lab Results   Component Value Date    WBC 4.5 04/23/2019    RBC 5.12 04/23/2019    HGB 16.1 04/23/2019    HCT 50.1 04/23/2019    MCV 97.9 04/23/2019    RDW 11.8 04/23/2019     04/23/2019       CMP:   Lab Results   Component Value Date     04/23/2019    K 4.1 04/23/2019     04/23/2019    CO2 24 04/23/2019    BUN 15 04/23/2019    CREATININE 0.64 04/23/2019    GFRAA >60 04/23/2019    LABGLOM >60 04/23/2019    GLUCOSE 98 04/10/2015    CALCIUM 10.0 04/10/2015       POC Tests: No results for input(s): POCGLU, POCNA, POCK, POCCL, POCBUN, POCHEMO, POCHCT in the last 72 hours.     Coags:   Lab Results   Component Value Date    PROTIME 10.6 04/23/2019    INR 1.0 04/23/2019    APTT 24.2 04/23/2019       HCG (If Applicable): No results found for: PREGTESTUR, PREGSERUM, HCG, HCGQUANT     ABGs: No results found for: PHART, PO2ART, GNV9WPH, TMP8YLK, BEART, C4BAQLWV Type & Screen (If Applicable):  No results found for: [facility]    Anesthesia Evaluation  Patient summary reviewed and Nursing notes reviewed no history of anesthetic complications:   Airway: Mallampati: II  TM distance: >3 FB   Neck ROM: full  Mouth opening: > = 3 FB Dental: normal exam         Pulmonary:Negative Pulmonary ROS and normal exam                               Cardiovascular:Negative CV ROS  Exercise tolerance: good (>4 METS),   (+) MCARTHUR:,       ECG reviewed  Rhythm: regular  Rate: normal                    Neuro/Psych:   Negative Neuro/Psych ROS              GI/Hepatic/Renal:   (+) GERD: well controlled, renal disease: kidney stones,          ROS comment: Right renal calculi. Endo/Other:    (+) : arthritis: OA., .                 Abdominal:           Vascular: negative vascular ROS. Anesthesia Plan      general and MAC     ASA 2     (Asa 2)  Induction: intravenous. MIPS: Postoperative opioids intended. Anesthetic plan and risks discussed with patient. Plan discussed with CRNA.     Attending anesthesiologist reviewed and agrees with Lauro Casas MD   5/1/2019

## 2019-05-01 NOTE — ANESTHESIA POSTPROCEDURE EVALUATION
Department of Anesthesiology  Postprocedure Note    Patient: Manpreet Herring  MRN: [id number]  YOB: 1955  Date of evaluation: 5/1/2019  Time:  3:51 PM     Procedure Summary     Date:  05/01/19 Room / Location:  [facility] Special Procedures    Anesthesia Start:  3313 Anesthesia Stop:  2727    Procedure:  IR NEPHROSTOMY PERCUTANEOUS RIGHT Diagnosis:       Kidney stones      Kidney stones      (Kidney stones)    Scheduled Providers:  [facility] Interventional Radiologist Responsible Provider:  Elvie Raman MD    Anesthesia Type:  general, MAC ASA Status:  2          Anesthesia Type: No value filed. Irma Phase I:      Irma Phase II: Irma Score: 9    Last vitals: Reviewed and per EMR flowsheets.    POST-OP ANESTHESIA NOTE       BP (!) 154/64   Pulse 73   Temp 97.9 °F (36.6 °C) (Temporal)   Resp 19   SpO2 98%    Pain Assessment: 0-10  Pain Level: 7       Anesthesia Post Evaluation    Patient location during evaluation: PACU  Patient participation: complete - patient participated  Level of consciousness: awake  Pain score: 7  Airway patency: patent  Nausea & Vomiting: no nausea and no vomiting  Complications: no  Cardiovascular status: hemodynamically stable  Respiratory status: acceptable  Hydration status: stable

## 2019-05-01 NOTE — ANESTHESIA PRE PROCEDURE
Department of Anesthesiology  Preprocedure Note       Name:  Steff Kaiser   Age:  59 y.o.  :  1955                                          MRN:  [id number]         Date:  2019      Surgeon: Francois Velarde):  Melodie Whyte MD    Procedure: IR procedure    Medications prior to admission:   Prior to Admission medications    Medication Sig Start Date End Date Taking? Authorizing Provider   traMADol (ULTRAM) 50 MG tablet Take 50 mg by mouth every 6 hours as needed for Pain. Historical Provider, MD   tamsulosin (FLOMAX) 0.4 MG capsule Take 0.4 mg by mouth daily    Historical Provider, MD   ferrous sulfate 325 (65 Fe) MG tablet Take 325 mg by mouth daily (with breakfast)    Historical Provider, MD   Cholecalciferol (VITAMIN D3) 00746 units CAPS Take 1 capsule by mouth    Historical Provider, MD   Biotin 1000 MCG TABS Take 5,000 mcg by mouth daily    Historical Provider, MD   acetaminophen (TYLENOL) 500 MG tablet Take 1,000 mg by mouth every 6 hours as needed for Pain    Historical Provider, MD   atorvastatin (LIPITOR) 10 MG tablet Take 10 mg by mouth daily    Historical Provider, MD   lansoprazole (PREVACID) 15 MG capsule Take 15 mg by mouth daily    Historical Provider, MD   Glucosamine-Chondroit-Vit C-Mn (GLUCOSAMINE 1500 COMPLEX PO) Take 1 tablet by mouth daily    Historical Provider, MD   senna (SENOKOT) 8.6 MG tablet Take 1 tablet by mouth daily    Historical Provider, MD   fluocinonide (LIDEX) 0.05 % cream Apply 1 oz topically nightly as needed Apply topically 2 times daily. Historical Provider, MD       Current medications:    No current facility-administered medications for this encounter. No current outpatient medications on file.      Facility-Administered Medications Ordered in Other Encounters   Medication Dose Route Frequency Provider Last Rate Last Dose    0.9 % sodium chloride infusion   Intravenous Continuous Rosendo Taylor MD        ciprofloxacin (CIPRO) IVPB 400 mg  400 mg Intravenous Once Susanna Romero MD        lactated ringers infusion 1,000 mL  1,000 mL Intravenous Continuous Eliecer Olson MD 50 mL/hr at 05/01/19 0748 1,000 mL at 05/01/19 0748    ceFAZolin (ANCEF) 2 g in dextrose 5 % 50 mL IVPB  2 g Intravenous Once Claudine Kaur PA-C           Allergies: Allergies   Allergen Reactions    Nylon      surtures    Other      Nylon sutures-wound does not heal when used    Morphine Nausea And Vomiting       Problem List:    Patient Active Problem List   Diagnosis Code    Flank pain R10.9    Ureteral stone with hydronephrosis N13.2    Nausea & vomiting D95.8    Umbilical hernia without obstruction and without gangrene K42.9    Obstructive uropathy N13.9    Chronic midline low back pain M54.5, G89.29       Past Medical History:        Diagnosis Date    Arthritis     Chronic kidney disease     DDD (degenerative disc disease), cervical     lumbar    Difficulty urinating     Fracture lumbar vertebra-closed ([address]     chronic fracture to T11 T12    GERD (gastroesophageal reflux disease)     Hyperlipidemia     Kidney stone     Palpitations     Ureteral stone with hydronephrosis 10/27/2014       Past Surgical History:        Procedure Laterality Date    APPENDECTOMY      CARPAL TUNNEL RELEASE Right     7/29/16    CRANIOTOMY      CYSTOSCOPY  10/28/14    ureteroscopy, left ureteral stent placement    HERNIA REPAIR      ingenal, umbilical    HERNIA REPAIR  32/24/5825    umbilical    LITHOTRIPSY         Social History:    Social History     Tobacco Use    Smoking status: Never Smoker    Smokeless tobacco: Never Used   Substance Use Topics    Alcohol use: Yes     Comment: rare                                Counseling given: Not Answered      Vital Signs (Current): There were no vitals filed for this visit.                                            BP Readings from Last 3 Encounters:   05/01/19 (!) 144/80   04/23/19 128/68   08/29/16 157/84       NPO Status:                                                                                 BMI:   Wt Readings from Last 3 Encounters:   05/01/19 170 lb (77.1 kg)   04/23/19 175 lb (79.4 kg)   08/29/16 195 lb (88.5 kg)     There is no height or weight on file to calculate BMI.    CBC:   Lab Results   Component Value Date    WBC 4.5 04/23/2019    RBC 5.12 04/23/2019    HGB 16.1 04/23/2019    HCT 50.1 04/23/2019    MCV 97.9 04/23/2019    RDW 11.8 04/23/2019     04/23/2019       CMP:   Lab Results   Component Value Date     04/23/2019    K 4.1 04/23/2019     04/23/2019    CO2 24 04/23/2019    BUN 15 04/23/2019    CREATININE 0.64 04/23/2019    GFRAA >60 04/23/2019    LABGLOM >60 04/23/2019    GLUCOSE 98 04/10/2015    CALCIUM 10.0 04/10/2015       POC Tests: No results for input(s): POCGLU, POCNA, POCK, POCCL, POCBUN, POCHEMO, POCHCT in the last 72 hours. Coags:   Lab Results   Component Value Date    PROTIME 10.6 04/23/2019    INR 1.0 04/23/2019    APTT 24.2 04/23/2019       HCG (If Applicable): No results found for: PREGTESTUR, PREGSERUM, HCG, HCGQUANT     ABGs: No results found for: PHART, PO2ART, JWG0ATN, HOB9PNQ, BEART, [id number]     Type & Screen (If Applicable):  No results found for: LABABO, [address]    Anesthesia Evaluation  Patient summary reviewed and Nursing notes reviewed no history of anesthetic complications:   Airway: Mallampati: II  TM distance: >3 FB   Neck ROM: full  Mouth opening: > = 3 FB Dental: normal exam         Pulmonary:Negative Pulmonary ROS and normal exam                               Cardiovascular:Negative CV ROS                      Neuro/Psych:   Negative Neuro/Psych ROS              GI/Hepatic/Renal:   (+) GERD:, renal disease: kidney stones,           Endo/Other:    (+) : arthritis:., .                 Abdominal:           Vascular: negative vascular ROS.                                    Anesthesia Plan      general and MAC     ASA 2       Induction: intravenous. MIPS: Postoperative opioids intended. Anesthetic plan and risks discussed with patient. Plan discussed with CRNA.                 Derik Javed MD   5/1/2019

## 2019-05-01 NOTE — ANESTHESIA POSTPROCEDURE EVALUATION
Department of Anesthesiology  Postprocedure Note    Patient: Anthony Cary  MRN: [id number]  YOB: 1955  Date of evaluation: 2019  Time:  10:22 AM     Procedure Summary     Date:  19 Room / Location:  [facility] OR  / [facility] OR    Anesthesia Start:   Anesthesia Stop:  936    Procedure:  CYSTOSCOPY, INSERTION OCCLUSIVE BALLOON, PT GOING TO INTERVENTIONAL RAD (Right ) Diagnosis:  (RIGHT KIDNEY STONE)    Surgeon:  Annabelle Adair MD Responsible Provider:  Zulma Bates MD    Anesthesia Type:  general, MAC ASA Status:  2          Anesthesia Type: general, MAC    Irma Phase I: Irma Score: 10    Irma Phase II:      Last vitals: Reviewed and per EMR flowsheets.        Anesthesia Post Evaluation   Vital Signs (Current)   Vitals:    19 075   BP: (!) 144/80   Pulse:    Resp:    Temp:    SpO2:      Vital Signs Statistics (for past 48 hrs)     Temp  Av.7 °F (36.5 °C)  Min: 97.7 °F (36.5 °C)   Min taken time: 19 0750  Max: 97.7 °F (36.5 °C)   Max taken time: 19 0750  Pulse  Av  Min: 59   Min taken time: 19 0750  Max: 59   Max taken time: 19 0750  Resp  Av.3  Min: 0   Min taken time: 19 9736  Max: 21   Max taken time: 19 1004  BP  Min: 95/56   Min taken time: 19  Max: 144/80   Max taken time: 19 0751  SpO2  Av.8 %  Min: 94 %   Min taken time: 19 0917  Max: 100 %   Max taken time: 19 0901    BP Readings from Last 3 Encounters:   19 (!) 144/80   19 (!) 125/49   19 128/68       Level of Consciousness:  Awake    Respiratory:  Stable    Airway :   Patent    Oxygen Saturation:  Stable    Cardiovascular:  Stable    Hydration:  Adequate    PONV:  Stable    Post-op Pain:  Adequate analgesia    Post-op Assessment:  No apparent anesthetic complications    Additional Follow-Up / Treatment / Comment:  None

## 2019-05-01 NOTE — ANESTHESIA POSTPROCEDURE EVALUATION
Department of Anesthesiology  Postprocedure Note    Patient: Rosa Elena Valdes  MRN: [id number]  YOB: 1955  Date of evaluation: 5/1/2019  Time:  6:41 PM     Procedure Summary     Date:  05/01/19 Room / Location:  [facility] OR [address] [place] [address] OR    Anesthesia Start:  9535 Anesthesia Stop:  1600    Procedure:  NEPHROLITHOTOMY PERCUTANEOUS, LITHOCLAST, C-ARM (Right ) Diagnosis:  (RIGHT KIDNEY STONE)    Surgeon:  Sd Mars MD Responsible Provider:  Malissa Amaro MD    Anesthesia Type:  general ASA Status:  2          Anesthesia Type: general    Irma Phase I: Irma Score: 9    Irma Phase II:      Last vitals: Reviewed and per EMR flowsheets.        Anesthesia Post Evaluation    Patient location during evaluation: PACU  Patient participation: complete - patient participated  Level of consciousness: awake  Pain score: 1  Airway patency: patent  Nausea & Vomiting: no nausea and no vomiting  Complications: no  Cardiovascular status: blood pressure returned to baseline and hemodynamically stable  Respiratory status: acceptable  Hydration status: euvolemic

## 2019-05-01 NOTE — ANESTHESIA PRE PROCEDURE
Department of Anesthesiology  Preprocedure Note       Name:  Manpreet Herring   Age:  59 y.o.  :  1955                                          MRN:  [id number]         Date:  2019      Surgeon: Benjy Anaya):  Fidencio Hunter MD    Procedure:  HOLMIUM LASER, NEPHROLITHOTOMY PERCUTANEOUS, LITHOCLAST, C-ARM  (PT COMING FROM INTERVENTIONAL RAD) **SHORT STAY** (Right )   Anesthesia type: General   Pre-op diagnosis: RIGHT KIDNEY STONE         Medications prior to admission:   Prior to Admission medications    Medication Sig Start Date End Date Taking? Authorizing Provider   traMADol (ULTRAM) 50 MG tablet Take 50 mg by mouth every 6 hours as needed for Pain. Historical Provider, MD   tamsulosin (FLOMAX) 0.4 MG capsule Take 0.4 mg by mouth daily    Historical Provider, MD   ferrous sulfate 325 (65 Fe) MG tablet Take 325 mg by mouth daily (with breakfast)    Historical Provider, MD   Cholecalciferol (VITAMIN D3) 63352 units CAPS Take 1 capsule by mouth    Historical Provider, MD   Biotin 1000 MCG TABS Take 5,000 mcg by mouth daily    Historical Provider, MD   acetaminophen (TYLENOL) 500 MG tablet Take 1,000 mg by mouth every 6 hours as needed for Pain    Historical Provider, MD   atorvastatin (LIPITOR) 10 MG tablet Take 10 mg by mouth daily    Historical Provider, MD   lansoprazole (PREVACID) 15 MG capsule Take 15 mg by mouth daily    Historical Provider, MD   Glucosamine-Chondroit-Vit C-Mn (GLUCOSAMINE 1500 COMPLEX PO) Take 1 tablet by mouth daily    Historical Provider, MD   senna (SENOKOT) 8.6 MG tablet Take 1 tablet by mouth daily    Historical Provider, MD   fluocinonide (LIDEX) 0.05 % cream Apply 1 oz topically nightly as needed Apply topically 2 times daily. Historical Provider, MD       Current medications:    No current facility-administered medications for this visit. No current outpatient medications on file.      Facility-Administered Medications Ordered in Other Visits   Medication Dose Route Frequency Provider Last Rate Last Dose    0.9 % sodium chloride infusion   Intravenous Continuous Prashant Muñoz MD        ciprofloxacin (CIPRO) IVPB 400 mg  400 mg Intravenous Once Prashant Muñoz MD        lactated ringers infusion 1,000 mL  1,000 mL Intravenous Continuous Hoda King MD 50 mL/hr at 05/01/19 0748 1,000 mL at 05/01/19 0748    ondansetron (ZOFRAN) injection 4 mg  4 mg Intravenous Once PRN Hoda King MD        labetalol (NORMODYNE;TRANDATE) injection 5 mg  5 mg Intravenous Q10 Min PRN Hoda King MD        fentaNYL (SUBLIMAZE) injection 50 mcg  50 mcg Intravenous Q5 Min PRN Hoda King MD        morphine (PF) injection 2 mg  2 mg Intravenous Q5 Min PRN Hoda King MD        meperidine (DEMEROL) injection 12.5 mg  12.5 mg Intravenous Q5 Min PRN Verito Gonzalez MD        midazolam (VERSED) 2 MG/2ML injection                Allergies:     Allergies   Allergen Reactions    Nylon      surtures    Other      Nylon sutures-wound does not heal when used    Morphine Nausea And Vomiting       Problem List:    Patient Active Problem List   Diagnosis Code    Flank pain R10.9    Ureteral stone with hydronephrosis N13.2    Nausea & vomiting Z63.8    Umbilical hernia without obstruction and without gangrene K42.9    Obstructive uropathy N13.9    Chronic midline low back pain M54.5, G89.29       Past Medical History:        Diagnosis Date    Arthritis     Chronic kidney disease     DDD (degenerative disc disease), cervical     lumbar    Difficulty urinating     Fracture lumbar vertebra-closed ([facility] [address]     chronic fracture to T11 T12    GERD (gastroesophageal reflux disease)     Hyperlipidemia     Kidney stone     Palpitations     Ureteral stone with hydronephrosis 10/27/2014       Past Surgical History:        Procedure Laterality Date    APPENDECTOMY      CARPAL TUNNEL RELEASE Right     7/29/16    CRANIOTOMY      CYSTOSCOPY  10/28/14    ureteroscopy, left ureteral stent placement    HERNIA REPAIR      ingenal, umbilical    HERNIA REPAIR  26/66/7672    umbilical    LITHOTRIPSY      OTHER SURGICAL HISTORY Right 05/01/2019    wire to bladder       Social History:    Social History     Tobacco Use    Smoking status: Never Smoker    Smokeless tobacco: Never Used   Substance Use Topics    Alcohol use: Yes     Comment: rare                                Counseling given: Not Answered      Vital Signs (Current): There were no vitals filed for this visit. BP Readings from Last 3 Encounters:   05/01/19 (!) 144/80   05/01/19 (!) 125/49   05/01/19 (!) 151/79       NPO Status:                                                                                 BMI:   Wt Readings from Last 3 Encounters:   05/01/19 170 lb (77.1 kg)   04/23/19 175 lb (79.4 kg)   08/29/16 195 lb (88.5 kg)     There is no height or weight on file to calculate BMI.    CBC:   Lab Results   Component Value Date    WBC 4.5 04/23/2019    RBC 5.12 04/23/2019    HGB 16.1 04/23/2019    HCT 50.1 04/23/2019    MCV 97.9 04/23/2019    RDW 11.8 04/23/2019     04/23/2019       CMP:   Lab Results   Component Value Date     04/23/2019    K 4.1 04/23/2019     04/23/2019    CO2 24 04/23/2019    BUN 15 04/23/2019    CREATININE 0.64 04/23/2019    GFRAA >60 04/23/2019    LABGLOM >60 04/23/2019    GLUCOSE 98 04/10/2015    CALCIUM 10.0 04/10/2015       POC Tests: No results for input(s): POCGLU, POCNA, POCK, POCCL, POCBUN, POCHEMO, POCHCT in the last 72 hours.     Coags:   Lab Results   Component Value Date    PROTIME 10.6 04/23/2019    INR 1.0 04/23/2019    APTT 24.2 04/23/2019       HCG (If Applicable): No results found for: PREGTESTUR, PREGSERUM, HCG, HCGQUANT     ABGs: No results found for: PHART, PO2ART, IRN8WXV, HDC9BML, BEART, J4CYSKVI     Type & Screen (If Applicable):  No results found [facility]: [facility]    Anesthesia Evaluation  Patient summary

## 2019-05-01 NOTE — OP NOTE
FACILITY:  [address] [place] [address]  DATE: 05/01/19  Isabell Frias  1955  [id number]     SURGEON:   Lencho Bangura MD  PREOPERATIVE DIAGNOSIS:  Renal calculus (2.5 cm)  POSTOPERATIVE DIAGNOSIS: Same  PROCEDURES PERFORMED:  1. rigth sided percutaneous nephrolithotomy for stone greater than 2 cm. 2. Antegrade nephrostogram.  3. Nephroureteral catheter placement. 4. Dilatation of nephrostomy tube tract   5. Antegrade ureteroscopy. ANESTHESIA:  Gen et    COMPLICATIONS: None. DRAINS:   A 26 [address] left Malecot with nephroureteral extension. SPECIMEN: renal calculus  ESTIMATED BLOOD LOSS:  Less than 5 mL.     INDICATION FOR PROCEDURE:  Geneva Larson is a 59 y.o. male here for percutaneous nephrolithotomy procedure. After the procedure, risks, benefits, and alternatives were explained to the patient she elected to proceed. Informed consent was obtained. DETAILS OF PROCEDURE:  Patient was brought back to the operating suite and placed in the prone position on the operative table. Patient was appropriately positioned. Patient was prepped and draped in a sterile fashion. Earlier today patient had had an occlusion balloon catheter placed with percutaneous access per interventional radiology. We made an incision at the site of the wire. We passed a Sosnowiec dilator over the wire and then a 2nd wire backing up the Sosnowiec. We then pushed the NephroMax occlusion balloon over the wire. When it was within the kidney we inflated it to a pressure of 18-20 and maintained it there. The balloon was then taken down and removed. We then advanced the rigid nephroscope into the sheath. We could see the collecting system and could see the large renal pelvic stone. We began by using the [address] primarily on ultrasound and were able to break up the stones. Some pieces were pulled out and sent for analysis.  We continued and found the rest of the stones which were also broken up with the [address] and some pieces were pulled out. We then advanced into the renal pelvis and advanced the sheath over our scope. We then performed antegrade ureteroscopy. We again examined the kidney. At this point on x-ray there were no residual stones that were seen. We then passed the [address] Malecot with a nephroureteral extension through the sheath. We shot an antegrade nephrostogram and there was no extravasation of contrast. Malecot was in good position The sheath was removed and Malecot was sutured into place with a U stitch and placed to drainage. The procedure was terminated. The patient was awoken, extubated and transferred to PACU in stable condition. The attending was present for all critical portions of the procedure    PLAN  Patient will be observed overnight.

## 2019-05-01 NOTE — H&P
History and Physical Update    Pt Name: Crystal Toure  MRN: [id number]  YOB: 1955  Date of evaluation: 5/1/2019    [x] I have examined the patient and reviewed the H&P/Consult and there are no changes to the patient or plans. [] I have examined the patient and reviewed the H&P/Consult and have noted the following changes:        Fernando Ramos PAC  electronically signed 5/1/2019 at 7:24 AM        Expand All Collapse All          Show:Clear all  [x]Manual[x]Template[]Copied    Added by:  [x]ANI Norman      []Shazia for details      History and Physical     Pt Name: Crystal Toure  MRN: [id number]  YOB: 1955  Date of evaluation: 4/23/2019  Primary Care Physician: Arcadio Dance, MD  Patient evaluated at the request of  Dr. Rodgers Child     Reason for evaluation:   Right renal stones   SUBJECTIVE:   History of Chief Complaint:       Huma Amado is a 59 y.o. male  Who had a hx of kidney stones that   First started over 15 yrs ago   At present right flank pain with gross hematuria  That he noticed 2 months ago approx  , has drank soda x 35 yrs . Hx of 4 episodes of renal stones he reports through the years .                     Past Medical History       has a past medical history of Arthritis, Chronic kidney disease, DDD (degenerative disc disease), cervical, Difficulty urinating, Fracture lumbar vertebra-closed ([address] GERD (gastroesophageal reflux disease), Hyperlipidemia, Kidney stone, Palpitations, and Ureteral stone with hydronephrosis.     Past Surgical History   has a past surgical history that includes Appendectomy; craniotomy; Lithotripsy; Cystoscopy (10/28/14); Carpal tunnel release (Right); hernia repair; and hernia repair (08/29/2016).          Medications   Scheduled Meds:  Current Outpatient Rx          Current Outpatient Rx   Medication Sig Dispense Refill    traMADol (ULTRAM) 50 MG tablet Take 50 mg by mouth every 6 hours as needed for Pain.        tamsulosin  Flank pain    Ureteral stone with hydronephrosis    Nausea & vomiting    Umbilical hernia without obstruction and without gangrene    Obstructive uropathy    Chronic midline low back pain                   IMPRESSIONS: 1.    right renal stones   2. does not have any pertinent problems on file.     [address] PAC  Electronically signed 4/23/2019 at 8:51 AM        Scheduled for:    Right kidney stone surgery

## 2019-05-02 ENCOUNTER — APPOINTMENT (OUTPATIENT)
Dept: CT IMAGING | Age: 64
DRG: 661 | End: 2019-05-02
Attending: UROLOGY
Payer: COMMERCIAL

## 2019-05-02 LAB
ANION GAP SERPL CALCULATED.3IONS-SCNC: 11 MMOL/L (ref 9–17)
BUN BLDV-MCNC: 10 MG/DL (ref 8–23)
BUN/CREAT BLD: ABNORMAL (ref 9–20)
CALCIUM SERPL-MCNC: 9.7 MG/DL (ref 8.6–10.4)
CHLORIDE BLD-SCNC: 98 MMOL/L (ref 98–107)
CO2: 26 MMOL/L (ref 20–31)
CREAT SERPL-MCNC: 0.8 MG/DL (ref 0.7–1.2)
GFR AFRICAN AMERICAN: >60 ML/MIN
GFR NON-AFRICAN AMERICAN: >60 ML/MIN
GFR SERPL CREATININE-BSD FRML MDRD: ABNORMAL ML/MIN/{1.73_M2}
GFR SERPL CREATININE-BSD FRML MDRD: ABNORMAL ML/MIN/{1.73_M2}
GLUCOSE BLD-MCNC: 112 MG/DL (ref 70–99)
HCT VFR BLD CALC: 44.4 % (ref 40.7–50.3)
HEMOGLOBIN: 14.9 G/DL (ref 13–17)
MCH RBC QN AUTO: 32.5 PG (ref 25.2–33.5)
MCHC RBC AUTO-ENTMCNC: 33.6 G/DL (ref 28.4–34.8)
MCV RBC AUTO: 96.9 FL (ref 82.6–102.9)
NRBC AUTOMATED: 0 PER 100 WBC
PDW BLD-RTO: 11.5 % (ref 11.8–14.4)
PLATELET # BLD: 210 K/UL (ref 138–453)
PMV BLD AUTO: 11.7 FL (ref 8.1–13.5)
POTASSIUM SERPL-SCNC: 5.1 MMOL/L (ref 3.7–5.3)
RBC # BLD: 4.58 M/UL (ref 4.21–5.77)
SODIUM BLD-SCNC: 135 MMOL/L (ref 135–144)
WBC # BLD: 12.1 K/UL (ref 3.5–11.3)

## 2019-05-02 PROCEDURE — 36415 COLL VENOUS BLD VENIPUNCTURE: CPT

## 2019-05-02 PROCEDURE — 6360000002 HC RX W HCPCS: Performed by: STUDENT IN AN ORGANIZED HEALTH CARE EDUCATION/TRAINING PROGRAM

## 2019-05-02 PROCEDURE — 74176 CT ABD & PELVIS W/O CONTRAST: CPT

## 2019-05-02 PROCEDURE — 85027 COMPLETE CBC AUTOMATED: CPT

## 2019-05-02 PROCEDURE — 6370000000 HC RX 637 (ALT 250 FOR IP): Performed by: PHYSICIAN ASSISTANT

## 2019-05-02 PROCEDURE — 2580000003 HC RX 258: Performed by: PHYSICIAN ASSISTANT

## 2019-05-02 PROCEDURE — 6370000000 HC RX 637 (ALT 250 FOR IP): Performed by: UROLOGY

## 2019-05-02 PROCEDURE — 96375 TX/PRO/DX INJ NEW DRUG ADDON: CPT

## 2019-05-02 PROCEDURE — G0378 HOSPITAL OBSERVATION PER HR: HCPCS

## 2019-05-02 PROCEDURE — 80048 BASIC METABOLIC PNL TOTAL CA: CPT

## 2019-05-02 PROCEDURE — 6370000000 HC RX 637 (ALT 250 FOR IP): Performed by: STUDENT IN AN ORGANIZED HEALTH CARE EDUCATION/TRAINING PROGRAM

## 2019-05-02 PROCEDURE — 96374 THER/PROPH/DIAG INJ IV PUSH: CPT

## 2019-05-02 PROCEDURE — 96376 TX/PRO/DX INJ SAME DRUG ADON: CPT

## 2019-05-02 PROCEDURE — 6360000002 HC RX W HCPCS: Performed by: PHYSICIAN ASSISTANT

## 2019-05-02 RX ORDER — TRAMADOL HYDROCHLORIDE 50 MG/1
50 TABLET ORAL EVERY 6 HOURS PRN
Status: DISCONTINUED | OUTPATIENT
Start: 2019-05-02 | End: 2019-05-04 | Stop reason: HOSPADM

## 2019-05-02 RX ORDER — HYDROCODONE BITARTRATE AND ACETAMINOPHEN 5; 325 MG/1; MG/1
1 TABLET ORAL EVERY 6 HOURS PRN
Qty: 20 TABLET | Refills: 0 | Status: SHIPPED | OUTPATIENT
Start: 2019-05-02 | End: 2019-05-07

## 2019-05-02 RX ORDER — TRAMADOL HYDROCHLORIDE 50 MG/1
50 TABLET ORAL EVERY 6 HOURS PRN
Status: DISCONTINUED | OUTPATIENT
Start: 2019-05-02 | End: 2019-05-02

## 2019-05-02 RX ORDER — ATORVASTATIN CALCIUM 10 MG/1
10 TABLET, FILM COATED ORAL DAILY
Status: DISCONTINUED | OUTPATIENT
Start: 2019-05-02 | End: 2019-05-04 | Stop reason: HOSPADM

## 2019-05-02 RX ORDER — DOCUSATE SODIUM 100 MG/1
100 CAPSULE, LIQUID FILLED ORAL DAILY
Status: DISCONTINUED | OUTPATIENT
Start: 2019-05-02 | End: 2019-05-04 | Stop reason: HOSPADM

## 2019-05-02 RX ORDER — PROMETHAZINE HYDROCHLORIDE 25 MG/ML
12.5 INJECTION, SOLUTION INTRAMUSCULAR; INTRAVENOUS EVERY 6 HOURS PRN
Status: DISCONTINUED | OUTPATIENT
Start: 2019-05-02 | End: 2019-05-04 | Stop reason: HOSPADM

## 2019-05-02 RX ORDER — TAMSULOSIN HYDROCHLORIDE 0.4 MG/1
0.4 CAPSULE ORAL DAILY
Status: DISCONTINUED | OUTPATIENT
Start: 2019-05-02 | End: 2019-05-04 | Stop reason: HOSPADM

## 2019-05-02 RX ADMIN — HYDROCODONE BITARTRATE AND ACETAMINOPHEN 2 TABLET: 5; 325 TABLET ORAL at 23:47

## 2019-05-02 RX ADMIN — TAMSULOSIN HYDROCHLORIDE 0.4 MG: 0.4 CAPSULE ORAL at 11:39

## 2019-05-02 RX ADMIN — TRAMADOL HYDROCHLORIDE 50 MG: 50 TABLET ORAL at 18:32

## 2019-05-02 RX ADMIN — ATORVASTATIN CALCIUM 10 MG: 10 TABLET, FILM COATED ORAL at 11:39

## 2019-05-02 RX ADMIN — HYDROCODONE BITARTRATE AND ACETAMINOPHEN 1 TABLET: 5; 325 TABLET ORAL at 17:31

## 2019-05-02 RX ADMIN — SENNOSIDES 8.6 MG: 8.6 TABLET, FILM COATED ORAL at 20:11

## 2019-05-02 RX ADMIN — HYDROCODONE BITARTRATE AND ACETAMINOPHEN 2 TABLET: 5; 325 TABLET ORAL at 04:46

## 2019-05-02 RX ADMIN — FENTANYL CITRATE 25 MCG: 50 INJECTION, SOLUTION INTRAMUSCULAR; INTRAVENOUS at 00:50

## 2019-05-02 RX ADMIN — Medication 2 G: at 13:48

## 2019-05-02 RX ADMIN — DOCUSATE SODIUM 100 MG: 100 CAPSULE, LIQUID FILLED ORAL at 12:56

## 2019-05-02 RX ADMIN — Medication 2 G: at 06:30

## 2019-05-02 RX ADMIN — HYDROCODONE BITARTRATE AND ACETAMINOPHEN 2 TABLET: 5; 325 TABLET ORAL at 12:56

## 2019-05-02 RX ADMIN — FENTANYL CITRATE 25 MCG: 50 INJECTION, SOLUTION INTRAMUSCULAR; INTRAVENOUS at 06:58

## 2019-05-02 RX ADMIN — SENNOSIDES 8.6 MG: 8.6 TABLET, FILM COATED ORAL at 00:37

## 2019-05-02 RX ADMIN — SODIUM CHLORIDE: 9 INJECTION, SOLUTION INTRAVENOUS at 12:58

## 2019-05-02 RX ADMIN — HYDROCODONE BITARTRATE AND ACETAMINOPHEN 2 TABLET: 5; 325 TABLET ORAL at 08:36

## 2019-05-02 RX ADMIN — HYDROCODONE BITARTRATE AND ACETAMINOPHEN 1 TABLET: 5; 325 TABLET ORAL at 20:12

## 2019-05-02 RX ADMIN — FENTANYL CITRATE 25 MCG: 50 INJECTION, SOLUTION INTRAMUSCULAR; INTRAVENOUS at 02:59

## 2019-05-02 RX ADMIN — PROMETHAZINE HYDROCHLORIDE 12.5 MG: 25 INJECTION INTRAMUSCULAR; INTRAVENOUS at 03:27

## 2019-05-02 RX ADMIN — TRAMADOL HYDROCHLORIDE 50 MG: 50 TABLET ORAL at 11:39

## 2019-05-02 RX ADMIN — PANTOPRAZOLE SODIUM 20 MG: 20 TABLET, DELAYED RELEASE ORAL at 06:37

## 2019-05-02 ASSESSMENT — PAIN SCALES - GENERAL
PAINLEVEL_OUTOF10: 5
PAINLEVEL_OUTOF10: 4
PAINLEVEL_OUTOF10: 5
PAINLEVEL_OUTOF10: 6
PAINLEVEL_OUTOF10: 7
PAINLEVEL_OUTOF10: 8
PAINLEVEL_OUTOF10: 4
PAINLEVEL_OUTOF10: 5
PAINLEVEL_OUTOF10: 3
PAINLEVEL_OUTOF10: 6
PAINLEVEL_OUTOF10: 4
PAINLEVEL_OUTOF10: 4
PAINLEVEL_OUTOF10: 7
PAINLEVEL_OUTOF10: 7

## 2019-05-02 NOTE — PLAN OF CARE
Problem: Falls - Risk of:  Goal: Will remain free from falls  Description  Will remain free from falls  5/2/2019 1537 by Estella Linares RN  Outcome: Ongoing  5/2/2019 0351 by Karin Roberts RN  Outcome: Ongoing  Goal: Absence of physical injury  Description  Absence of physical injury  5/2/2019 1537 by Estella Linares RN  Outcome: Ongoing  5/2/2019 0351 by Karin Roberts RN  Outcome: Ongoing     Problem: Pain:  Goal: Pain level will decrease  Description  Pain level will decrease  5/2/2019 1537 by Estella Linares RN  Outcome: Ongoing  5/2/2019 0351 by Karin Roberts RN  Outcome: Ongoing  Goal: Control of acute pain  Description  Control of acute pain  5/2/2019 1537 by Estella Linares RN  Outcome: Ongoing  5/2/2019 0351 by Karin Roberts RN  Outcome: Ongoing  Goal: Control of chronic pain  Description  Control of chronic pain  5/2/2019 1537 by Estella Linares RN  Outcome: Ongoing  5/2/2019 0351 by Karin Roberts RN  Outcome: Ongoing

## 2019-05-02 NOTE — PROGRESS NOTES
D/t observation status, Flomax, lipitor, and tramadol need verified. Writer walked meds down to pharmacy to be verified. Meds given to [facility]. Was told he would return back to RN. 1115: Received meds back from pharmacy. Flomax and lipitor in pt's drawer. Tramadol placed in locked med room.

## 2019-05-02 NOTE — CARE COORDINATION
Case Management Initial Discharge [address],             Met with:patient to discuss discharge plans. Information verified: address, contacts, phone number, , insurance Yes  PCP: Sandrita Mathur MD  Date of last visit: 2019    Insurance Provider: [facility]    Discharge Planning    Living Arrangements:  Spouse/Significant Other   Support Systems:  Spouse/Significant Other    Home has 2 stories  1 stairs to climb to get into front door, 1 flight stairs to climb to reach second floor  Location of bedroom/bathroom in home 2nd floor    Patient able to perform ADL's:Independent    Current Services (outpatient & in home)   DME equipment:   DME provider:     Pharmacy: [facility]   Potential Assistance Purchasing Medications:  No  Does patient want to participate in local refill/ meds to beds program?  No    Potential Assistance Needed:  N/A    Patient agreeable to home care: No  [place] of choice provided:  n/a    Prior SNF/Rehab Placement and Facility:   Agreeable to SNF/Rehab: No  [place] of choice provided: n/a   Evaluation: n/a    Expected Discharge date:  19  Patient expects to be discharged to:  Home independent  Follow Up Appointment: Best Day/ Time: Wednesday AM    Transportation provider: Wife to provide  Transportation arrangements needed for discharge: No    Readmission Risk              Risk of Unplanned Readmission:        7             Does patient have a readmission risk score greater than 14?: No  If yes, follow-up appointment must be made within 7 days of discharge.      Discharge Plan: Home independent with wife          Electronically signed by Imtiaz Lee RN on 19 at 10:48 AM

## 2019-05-02 NOTE — PROGRESS NOTES
[place], [address], [address], [place]  Urology Progress Note     Subjective:     No acute urologic events overnight. Denies chest pain, shortness of breath,  fevers, chills. + ambulating, no flatus, no bowel movements. Pain controlled. +nausea and one episode of vomiting in the morning. Current Diet: DIET GENERAL;     Patient Vitals for the past 24 hrs:   BP Temp Temp src Pulse Resp SpO2 Height Weight   05/02/19 0439 138/67 99.6 °F (37.6 °C) Oral 64 18 93 % -- --   05/02/19 0100 134/69 99.5 °F (37.5 °C) Oral 70 16 93 % -- --   05/01/19 2054 (!) 147/81 98.8 °F (37.1 °C) Oral 89 18 90 % -- --   05/01/19 1757 (!) 142/70 97.7 °F (36.5 °C) Oral 71 14 93 % -- --   05/01/19 1745 138/65 -- -- 75 12 93 % -- --   05/01/19 1730 (!) 145/69 -- -- 77 14 95 % -- --   05/01/19 1715 (!) 147/72 -- -- 68 13 96 % -- --   05/01/19 1700 (!) 154/74 99.5 °F (37.5 °C) -- 89 15 95 % -- --   05/01/19 1645 (!) 146/71 -- -- 68 14 96 % -- --   05/01/19 1630 (!) 160/67 -- -- 73 16 95 % -- --   05/01/19 1615 (!) 143/68 -- -- 72 15 97 % -- --   05/01/19 1559 123/77 98.1 °F (36.7 °C) Temporal 80 23 97 % -- --   05/01/19 0751 (!) 144/80 -- -- -- -- -- -- --   05/01/19 0750 -- 97.7 °F (36.5 °C) Temporal 64 16 96 % -- --   05/01/19 0725 -- -- -- -- -- -- 5' 6\" (1.676 m) 170 lb (77.1 kg)       Intake/Output Summary (Last 24 hours) at 5/2/2019 0610  Last data filed at 5/2/2019 0550  Gross per 24 hour   Intake 3637 ml   Output 1785 ml   Net 1852 ml       Recent Labs     05/01/19  1843   WBC 10.8   HGB 15.2   HCT 45.1   MCV 97.0          Additional Lab/culture results: none    Physical Exam:     NAD  AOx3  RRR  Respirations nonlabored, symmetric chest rise bilaterally  Abdomen Soft, NT, ND  Leal draining clear yellow urine.  R PCNT draining clear pink urine  No calf ttp bilaterally  EPC cuffs on and functioning billaterally    Interval Imaging Findings: none    Impression:  60-yo F POD#1 s/p R PCNL    Plan:

## 2019-05-03 ENCOUNTER — APPOINTMENT (OUTPATIENT)
Dept: INTERVENTIONAL RADIOLOGY/VASCULAR | Age: 64
DRG: 661 | End: 2019-05-03
Attending: UROLOGY
Payer: COMMERCIAL

## 2019-05-03 ENCOUNTER — APPOINTMENT (OUTPATIENT)
Dept: CT IMAGING | Age: 64
DRG: 661 | End: 2019-05-03
Attending: UROLOGY
Payer: COMMERCIAL

## 2019-05-03 ENCOUNTER — APPOINTMENT (OUTPATIENT)
Dept: GENERAL RADIOLOGY | Age: 64
DRG: 661 | End: 2019-05-03
Attending: UROLOGY
Payer: COMMERCIAL

## 2019-05-03 LAB
ANION GAP SERPL CALCULATED.3IONS-SCNC: 9 MMOL/L (ref 9–17)
BUN BLDV-MCNC: 9 MG/DL (ref 8–23)
BUN/CREAT BLD: ABNORMAL (ref 9–20)
CALCIUM SERPL-MCNC: 9.4 MG/DL (ref 8.6–10.4)
CHLORIDE BLD-SCNC: 102 MMOL/L (ref 98–107)
CO2: 24 MMOL/L (ref 20–31)
CREAT SERPL-MCNC: 0.51 MG/DL (ref 0.7–1.2)
GFR AFRICAN AMERICAN: >60 ML/MIN
GFR NON-AFRICAN AMERICAN: >60 ML/MIN
GFR SERPL CREATININE-BSD FRML MDRD: ABNORMAL ML/MIN/{1.73_M2}
GFR SERPL CREATININE-BSD FRML MDRD: ABNORMAL ML/MIN/{1.73_M2}
GLUCOSE BLD-MCNC: 101 MG/DL (ref 70–99)
HCT VFR BLD CALC: 43.8 % (ref 40.7–50.3)
HEMOGLOBIN: 14.4 G/DL (ref 13–17)
INR BLD: 1
MCH RBC QN AUTO: 32.8 PG (ref 25.2–33.5)
MCHC RBC AUTO-ENTMCNC: 32.9 G/DL (ref 28.4–34.8)
MCV RBC AUTO: 99.8 FL (ref 82.6–102.9)
NRBC AUTOMATED: 0 PER 100 WBC
PARTIAL THROMBOPLASTIN TIME: 22.7 SEC (ref 20.5–30.5)
PDW BLD-RTO: 11.9 % (ref 11.8–14.4)
PLATELET # BLD: 171 K/UL (ref 138–453)
PMV BLD AUTO: 10.9 FL (ref 8.1–13.5)
POTASSIUM SERPL-SCNC: 4.1 MMOL/L (ref 3.7–5.3)
PROTHROMBIN TIME: 10.2 SEC (ref 9–12)
RBC # BLD: 4.39 M/UL (ref 4.21–5.77)
SODIUM BLD-SCNC: 135 MMOL/L (ref 135–144)
WBC # BLD: 7.8 K/UL (ref 3.5–11.3)

## 2019-05-03 PROCEDURE — 50693 PLMT URETERAL STENT PRQ: CPT | Performed by: RADIOLOGY

## 2019-05-03 PROCEDURE — 6360000002 HC RX W HCPCS: Performed by: RADIOLOGY

## 2019-05-03 PROCEDURE — 85610 PROTHROMBIN TIME: CPT

## 2019-05-03 PROCEDURE — 6360000004 HC RX CONTRAST MEDICATION: Performed by: UROLOGY

## 2019-05-03 PROCEDURE — 51798 US URINE CAPACITY MEASURE: CPT

## 2019-05-03 PROCEDURE — 71046 X-RAY EXAM CHEST 2 VIEWS: CPT

## 2019-05-03 PROCEDURE — 2709999900 HC NON-CHARGEABLE SUPPLY

## 2019-05-03 PROCEDURE — 0T763DZ DILATION OF RIGHT URETER WITH INTRALUMINAL DEVICE, PERCUTANEOUS APPROACH: ICD-10-PCS | Performed by: RADIOLOGY

## 2019-05-03 PROCEDURE — 71045 X-RAY EXAM CHEST 1 VIEW: CPT

## 2019-05-03 PROCEDURE — C1887 CATHETER, GUIDING: HCPCS

## 2019-05-03 PROCEDURE — 6360000002 HC RX W HCPCS: Performed by: STUDENT IN AN ORGANIZED HEALTH CARE EDUCATION/TRAINING PROGRAM

## 2019-05-03 PROCEDURE — 76937 US GUIDE VASCULAR ACCESS: CPT

## 2019-05-03 PROCEDURE — 1200000000 HC SEMI PRIVATE

## 2019-05-03 PROCEDURE — 2700000000 HC OXYGEN THERAPY PER DAY

## 2019-05-03 PROCEDURE — 85027 COMPLETE CBC AUTOMATED: CPT

## 2019-05-03 PROCEDURE — 2580000003 HC RX 258: Performed by: PHYSICIAN ASSISTANT

## 2019-05-03 PROCEDURE — C2617 STENT, NON-COR, TEM W/O DEL: HCPCS

## 2019-05-03 PROCEDURE — C1769 GUIDE WIRE: HCPCS

## 2019-05-03 PROCEDURE — 6370000000 HC RX 637 (ALT 250 FOR IP): Performed by: STUDENT IN AN ORGANIZED HEALTH CARE EDUCATION/TRAINING PROGRAM

## 2019-05-03 PROCEDURE — 36415 COLL VENOUS BLD VENIPUNCTURE: CPT

## 2019-05-03 PROCEDURE — 94760 N-INVAS EAR/PLS OXIMETRY 1: CPT

## 2019-05-03 PROCEDURE — 71260 CT THORAX DX C+: CPT

## 2019-05-03 PROCEDURE — 6370000000 HC RX 637 (ALT 250 FOR IP): Performed by: UROLOGY

## 2019-05-03 PROCEDURE — 85730 THROMBOPLASTIN TIME PARTIAL: CPT

## 2019-05-03 PROCEDURE — 51701 INSERT BLADDER CATHETER: CPT

## 2019-05-03 PROCEDURE — 80048 BASIC METABOLIC PNL TOTAL CA: CPT

## 2019-05-03 RX ORDER — MIDAZOLAM HYDROCHLORIDE 1 MG/ML
INJECTION INTRAMUSCULAR; INTRAVENOUS
Status: COMPLETED | OUTPATIENT
Start: 2019-05-03 | End: 2019-05-03

## 2019-05-03 RX ORDER — HYDROCODONE BITARTRATE AND ACETAMINOPHEN 5; 325 MG/1; MG/1
2 TABLET ORAL EVERY 4 HOURS PRN
Status: DISCONTINUED | OUTPATIENT
Start: 2019-05-03 | End: 2019-05-03

## 2019-05-03 RX ORDER — HYDROCODONE BITARTRATE AND ACETAMINOPHEN 5; 325 MG/1; MG/1
1 TABLET ORAL EVERY 4 HOURS PRN
Status: DISCONTINUED | OUTPATIENT
Start: 2019-05-03 | End: 2019-05-04 | Stop reason: HOSPADM

## 2019-05-03 RX ORDER — FENTANYL CITRATE 50 UG/ML
INJECTION, SOLUTION INTRAMUSCULAR; INTRAVENOUS
Status: COMPLETED | OUTPATIENT
Start: 2019-05-03 | End: 2019-05-03

## 2019-05-03 RX ORDER — HYDROCODONE BITARTRATE AND ACETAMINOPHEN 5; 325 MG/1; MG/1
2 TABLET ORAL EVERY 4 HOURS PRN
Status: DISCONTINUED | OUTPATIENT
Start: 2019-05-03 | End: 2019-05-04 | Stop reason: HOSPADM

## 2019-05-03 RX ORDER — HYDROCODONE BITARTRATE AND ACETAMINOPHEN 5; 325 MG/1; MG/1
1 TABLET ORAL EVERY 4 HOURS PRN
Status: DISCONTINUED | OUTPATIENT
Start: 2019-05-03 | End: 2019-05-03

## 2019-05-03 RX ORDER — HYDROCODONE BITARTRATE AND ACETAMINOPHEN 5; 325 MG/1; MG/1
1 TABLET ORAL EVERY 6 HOURS PRN
Status: DISCONTINUED | OUTPATIENT
Start: 2019-05-03 | End: 2019-05-03

## 2019-05-03 RX ADMIN — TAMSULOSIN HYDROCHLORIDE 0.4 MG: 0.4 CAPSULE ORAL at 18:20

## 2019-05-03 RX ADMIN — Medication 10 ML: at 20:27

## 2019-05-03 RX ADMIN — HYDROCODONE BITARTRATE AND ACETAMINOPHEN 2 TABLET: 5; 325 TABLET ORAL at 18:21

## 2019-05-03 RX ADMIN — HYDROCODONE BITARTRATE AND ACETAMINOPHEN 2 TABLET: 5; 325 TABLET ORAL at 09:21

## 2019-05-03 RX ADMIN — FENTANYL CITRATE 50 MCG: 50 INJECTION INTRAMUSCULAR; INTRAVENOUS at 16:08

## 2019-05-03 RX ADMIN — TRAMADOL HYDROCHLORIDE 50 MG: 50 TABLET ORAL at 07:37

## 2019-05-03 RX ADMIN — IOTHALAMATE MEGLUMINE 32 ML: 430 INJECTION INTRAVASCULAR at 16:27

## 2019-05-03 RX ADMIN — FENTANYL CITRATE 50 MCG: 50 INJECTION INTRAMUSCULAR; INTRAVENOUS at 16:04

## 2019-05-03 RX ADMIN — ATORVASTATIN CALCIUM 10 MG: 10 TABLET, FILM COATED ORAL at 18:20

## 2019-05-03 RX ADMIN — FENTANYL CITRATE 50 MCG: 50 INJECTION INTRAMUSCULAR; INTRAVENOUS at 16:23

## 2019-05-03 RX ADMIN — MIDAZOLAM HYDROCHLORIDE 1 MG: 1 INJECTION, SOLUTION INTRAMUSCULAR; INTRAVENOUS at 16:04

## 2019-05-03 RX ADMIN — DOCUSATE SODIUM 100 MG: 100 CAPSULE, LIQUID FILLED ORAL at 18:20

## 2019-05-03 RX ADMIN — LIDOCAINE HYDROCHLORIDE: 20 JELLY TOPICAL at 16:43

## 2019-05-03 RX ADMIN — SENNOSIDES 8.6 MG: 8.6 TABLET, FILM COATED ORAL at 20:27

## 2019-05-03 RX ADMIN — FENTANYL CITRATE 50 MCG: 50 INJECTION INTRAMUSCULAR; INTRAVENOUS at 15:57

## 2019-05-03 RX ADMIN — LIDOCAINE HYDROCHLORIDE: 20 JELLY TOPICAL at 09:52

## 2019-05-03 RX ADMIN — HYDROCODONE BITARTRATE AND ACETAMINOPHEN 2 TABLET: 5; 325 TABLET ORAL at 13:17

## 2019-05-03 RX ADMIN — IOHEXOL 75 ML: 350 INJECTION, SOLUTION INTRAVENOUS at 12:13

## 2019-05-03 RX ADMIN — MIDAZOLAM HYDROCHLORIDE 1 MG: 1 INJECTION, SOLUTION INTRAMUSCULAR; INTRAVENOUS at 15:57

## 2019-05-03 RX ADMIN — TRAMADOL HYDROCHLORIDE 50 MG: 50 TABLET ORAL at 01:13

## 2019-05-03 RX ADMIN — HYDROMORPHONE HYDROCHLORIDE 0.5 MG: 1 INJECTION, SOLUTION INTRAMUSCULAR; INTRAVENOUS; SUBCUTANEOUS at 17:20

## 2019-05-03 RX ADMIN — HYDROCODONE BITARTRATE AND ACETAMINOPHEN 2 TABLET: 5; 325 TABLET ORAL at 04:16

## 2019-05-03 RX ADMIN — HYDROCODONE BITARTRATE AND ACETAMINOPHEN 2 TABLET: 5; 325 TABLET ORAL at 22:58

## 2019-05-03 ASSESSMENT — PAIN DESCRIPTION - PAIN TYPE
TYPE: SURGICAL PAIN
TYPE: SURGICAL PAIN
TYPE: ACUTE PAIN;SURGICAL PAIN
TYPE: SURGICAL PAIN
TYPE: SURGICAL PAIN

## 2019-05-03 ASSESSMENT — PAIN SCALES - GENERAL
PAINLEVEL_OUTOF10: 7
PAINLEVEL_OUTOF10: 5
PAINLEVEL_OUTOF10: 5
PAINLEVEL_OUTOF10: 7
PAINLEVEL_OUTOF10: 6
PAINLEVEL_OUTOF10: 3
PAINLEVEL_OUTOF10: 2
PAINLEVEL_OUTOF10: 7

## 2019-05-03 ASSESSMENT — PAIN DESCRIPTION - LOCATION
LOCATION: FLANK
LOCATION: FLANK
LOCATION: BACK
LOCATION: FLANK;CHEST
LOCATION: BACK

## 2019-05-03 ASSESSMENT — PAIN DESCRIPTION - ORIENTATION
ORIENTATION: RIGHT

## 2019-05-03 ASSESSMENT — PAIN DESCRIPTION - DESCRIPTORS
DESCRIPTORS: SHARP
DESCRIPTORS: SHARP;STABBING
DESCRIPTORS: SHARP;STABBING
DESCRIPTORS: ACHING
DESCRIPTORS: ACHING

## 2019-05-03 NOTE — BRIEF OP NOTE
Brief Postoperative Note    Dayton Tinsley  YOB: 1955  [id number]    Pre-operative Diagnosis: S/p PCNL; failed clamping    Post-operative Diagnosis: Same    Procedure: Ureteral stent insertion    Anesthesia: Local    Surgeons/Assistants: Amaris Alba MD    Estimated Blood Loss: less than 50     Complications: None    Specimens: Was Not Obtained    Findings: 8 F double pigtail ureteral stent inserted under fluoro without incident. Malecot removed.     Electronically signed by Amaris Alba MD on 5/3/2019 at 4:33 PM

## 2019-05-03 NOTE — PROGRESS NOTES
Pt arrives to IR for stent placement  C/O SOB with deep inspiration and CP to the right extending down to Rt flank with deep inspiration. Pt alert and talking without distress. /56  HR 76  Sp02 95 and 2L NC with resp 20. Dr Ximena Montejo aware and suggests a full work up before proceeding with stent placement. Radiology to room for CXR. Spoke with TONIA Mobley and confirmed pt complaints and informed of above plan. Spoke with Dr Alexandra Lewis and informed of above and requested a PE work up before proceeding. Pt to return to floor for above.

## 2019-05-03 NOTE — POST SEDATION
Sedation Post Procedure Note    Patient Name: Sesar Brizuela   YOB: 1955  Room/Bed: 7969/2555-14  Medical Record Number: [id number]  Date: 5/3/2019   Time: 4:53 PM         Physicians/Assistants: Kandis Mcdaniel MD    Procedure Performed:  Ureteral stent placement and Malecot removal    Post-Sedation Vital Signs:  Vitals:    05/03/19 1645   BP: (!) 152/76   Pulse: 88   Resp: 25   Temp: 98.7 °F (37.1 °C)   SpO2: 92%      Vital signs were reviewed and were stable after the procedure (see flow sheet for vitals)            Post-Sedation Exam: Lungs: no crackles or wheezing           Complications: none    Electronically signed by Kandis Mcdaniel MD on 5/3/2019 at 4:53 PM

## 2019-05-03 NOTE — PROGRESS NOTES
Ky Givens, Mihir Zuñiga, Beatriz Burris, Boy Hsu  Urology Progress Note     Subjective:     No acute urologic events overnight. Denies chest pain, shortness of breath,  fevers, chills. + ambulating, no flatus, no bowel movements. Failed clamping trial x2 last night. Pain increasing overnight with PCNT to drainage. Current Diet: Diet NPO, After Midnight     Patient Vitals for the past 24 hrs:   BP Temp Temp src Pulse Resp SpO2   05/03/19 0113 -- 99 °F (37.2 °C) Oral -- -- --   05/02/19 2002 127/70 99.1 °F (37.3 °C) Oral 63 16 92 %   05/02/19 1600 115/60 99.3 °F (37.4 °C) -- 60 16 93 %   05/02/19 0800 128/61 98.9 °F (37.2 °C) -- 70 14 94 %       Intake/Output Summary (Last 24 hours) at 5/3/2019 0615  Last data filed at 5/2/2019 1732  Gross per 24 hour   Intake 2123 ml   Output 800 ml   Net 1323 ml       Recent Labs     05/01/19  1843 05/02/19  0608   WBC 10.8 12.1*   HGB 15.2 14.9   HCT 45.1 44.4   MCV 97.0 96.9    210       Additional Lab/culture results: none    Physical Exam:     NAD  AOx3  RRR  Respirations nonlabored, symmetric chest rise bilaterally  Abdomen Soft, NT, ND  Olivo draining clear yellow urine. R PCNT draining dark pink urine  No calf ttp bilaterally  EPC cuffs on and functioning billaterally    Interval Imaging Findings:   I independently reviewed and verified the images and reports from  CT A/P 5/2/19: 2 small non-obstructing stones in the right kidney. One of these could be layering dust fragments. No ureteral calculi. PCNT In place. Impression:  60-yo F POD#2 s/p R PCNL    Plan:   -olivo removed. PVRs today  -discussed clamping trial this morning. He has had increasing pain over the past few hours and would like to leave PCNT open to drainage until IR procedure  -to IR today for R antegrade stent and PCNT removal.   -po pain control  -EPC, IS, ambulate.  No pharmacologic DVT ppx due to high risk for bleeding after this type of procedure    Flor Burk  PGY-6, Urology    6:15 AM 5/3/2019

## 2019-05-03 NOTE — SEDATION DOCUMENTATION
8F x 24 cm percuflex ureteral stent placed to rt ureter  Ref [id number]  Exp 09/09/2021  Lot [id number]

## 2019-05-03 NOTE — PLAN OF CARE
Problem: Falls - Risk of:  Goal: Will remain free from falls  Description  Will remain free from falls  5/3/2019 0328 by Dandy Borden RN  Outcome: Ongoing  5/2/2019 1537 by Lopez Doty RN  Outcome: Ongoing  Goal: Absence of physical injury  Description  Absence of physical injury  5/3/2019 0328 by Dandy Borden RN  Outcome: Ongoing  5/2/2019 1537 by Lopez Doty RN  Outcome: Ongoing     Problem: Pain:  Goal: Pain level will decrease  Description  Pain level will decrease  5/3/2019 0328 by Dandy Borden RN  Outcome: Ongoing  5/2/2019 1537 by Lopez Doty RN  Outcome: Ongoing  Goal: Control of acute pain  Description  Control of acute pain  5/3/2019 0328 by Dandy Borden RN  Outcome: Ongoing  5/2/2019 1537 by Lopez Doty RN  Outcome: Ongoing  Goal: Control of chronic pain  Description  Control of chronic pain  5/3/2019 0328 by Dandy Borden RN  Outcome: Ongoing  5/2/2019 1537 by Lopez Doty RN  Outcome: Ongoing

## 2019-05-03 NOTE — CARE COORDINATION
Transitional Planning  Received message from UR stating pt is observation and per EHR meets inpt criteria.   Amy served Dr Kim Parson who was covering for Dr Kaz Hoang pt's attending

## 2019-05-03 NOTE — PRE SEDATION
Sedation Pre-Procedure Note    Patient Name: Jassi aGrcia   YOB: 1955  Room/Bed: 0136/5667-64  Medical Record Number: [id number]  Date: 5/3/2019   Time: 3:47 PM       Indication:  Ureteral stent insertion    Consent: I have discussed with the patient and/or the patient representative the indication, alternatives, and the possible risks and/or complications of the planned procedure and the anesthesia methods. The patient and/or patient representative appear to understand and agree to proceed. Vital Signs:   Vitals:    05/03/19 1041   BP:    Pulse: 83   Resp: 16   Temp:    SpO2: 95%       Past Medical History:   has a past medical history of Arthritis, Chronic kidney disease, DDD (degenerative disc disease), cervical, Difficulty urinating, Fracture lumbar vertebra-closed ([address] GERD (gastroesophageal reflux disease), Hyperlipidemia, Kidney stone, Palpitations, and Ureteral stone with hydronephrosis. Past Surgical History:   has a past surgical history that includes Appendectomy; craniotomy; Lithotripsy; Cystoscopy (10/28/14); Carpal tunnel release (Right); hernia repair; hernia repair (08/29/2016); other surgical history (Right, 05/01/2019); Nephrostomy (Right, 05/01/2019); Cystoscopy (Right, 5/1/2019); and NEPHROLITHOTOMY (Right, 5/1/2019). Medications:   Scheduled Meds:    docusate sodium  100 mg Oral Daily    atorvastatin  10 mg Oral Daily    tamsulosin  0.4 mg Oral Daily    pantoprazole  20 mg Oral QAM AC    sodium chloride flush  10 mL Intravenous 2 times per day    senna  1 tablet Oral Nightly     Continuous Infusions:    sodium chloride 125 mL/hr at 05/02/19 1258     PRN Meds: HYDROcodone 5 mg - acetaminophen **OR** HYDROcodone 5 mg - acetaminophen, lidocaine, promethazine, traMADol, sodium chloride flush, polyethylene glycol, ondansetron, opium-belladonna, acetaminophen  Home Meds:   Prior to Admission medications    Medication Sig Start Date End Date Taking?  Authorizing Provider   HYDROcodone-acetaminophen (NORCO) 5-325 MG per tablet Take 1 tablet by mouth every 6 hours as needed for Pain for up to 5 days. 5/2/19 5/7/19 Yes Di Edwards MD   traMADol (ULTRAM) 50 MG tablet Take 50 mg by mouth every 6 hours as needed for Pain. Yes Historical Provider, MD   tamsulosin (FLOMAX) 0.4 MG capsule Take 0.4 mg by mouth daily   Yes Historical Provider, MD   ferrous sulfate 325 (65 Fe) MG tablet Take 325 mg by mouth daily (with breakfast)   Yes Historical Provider, MD   Cholecalciferol (VITAMIN D3) 38694 units CAPS Take 1 capsule by mouth   Yes Historical Provider, MD   Biotin 1000 MCG TABS Take 5,000 mcg by mouth daily   Yes Historical Provider, MD   acetaminophen (TYLENOL) 500 MG tablet Take 1,000 mg by mouth every 6 hours as needed for Pain   Yes Historical Provider, MD   atorvastatin (LIPITOR) 10 MG tablet Take 10 mg by mouth daily   Yes Historical Provider, MD   lansoprazole (PREVACID) 15 MG capsule Take 15 mg by mouth daily   Yes Historical Provider, MD   Glucosamine-Chondroit-Vit C-Mn (GLUCOSAMINE 1500 COMPLEX PO) Take 1 tablet by mouth daily   Yes Historical Provider, MD   senna (SENOKOT) 8.6 MG tablet Take 1 tablet by mouth daily   Yes Historical Provider, MD   fluocinonide (LIDEX) 0.05 % cream Apply 1 oz topically nightly as needed Apply topically 2 times daily. Historical Provider, MD     Coumadin Use Last 7 Days:  no  Antiplatelet drug therapy use last 7 days: no  Other anticoagulant use last 7 days: no  Additional Medication Information:  None      Pre-Sedation Documentation and Exam:   I have reviewed the patient's history and review of systems.     Mallampati Airway Assessment:  Mallampati Class II - (soft palate, fauces & uvula are visible)    Prior History of Anesthesia Complications:   none    ASA Classification:  Class 1 - A normal healthy patient    Sedation/ Anesthesia Plan:   intravenous sedation    Medications Planned:   midazolam (Versed) intravenously and fentanyl

## 2019-05-03 NOTE — CONSULTS
Pt referred to IR for double pigtail ureteral stent placement post PCNL after failing clamping [place]. Upon initiating discussion for consent, patient mentioned new onset dyspnea with right sided chest pain upon deep inspiration extending to right flank. Pt alert and talking without distress. VSS--/56, HR 76, Sp02 95 and 2L NC with resp 20. Discussed with Urology; pt should have full work up before proceeding with stent placement. Nurse Jesusita Nolasco spoke with TONIA Dave confirming pt complaints and informed her of above plan. She also spoke with Dr Leonor Nix and informed of above and he requested a PE work up before proceeding. Pt returned to floor for above.

## 2019-05-04 ENCOUNTER — APPOINTMENT (OUTPATIENT)
Dept: GENERAL RADIOLOGY | Age: 64
DRG: 661 | End: 2019-05-04
Attending: UROLOGY
Payer: COMMERCIAL

## 2019-05-04 VITALS
RESPIRATION RATE: 16 BRPM | HEART RATE: 68 BPM | TEMPERATURE: 99.4 F | DIASTOLIC BLOOD PRESSURE: 60 MMHG | OXYGEN SATURATION: 94 % | HEIGHT: 66 IN | WEIGHT: 170 LBS | BODY MASS INDEX: 27.32 KG/M2 | SYSTOLIC BLOOD PRESSURE: 148 MMHG

## 2019-05-04 LAB
ANION GAP SERPL CALCULATED.3IONS-SCNC: 13 MMOL/L (ref 9–17)
BUN BLDV-MCNC: 9 MG/DL (ref 8–23)
BUN/CREAT BLD: ABNORMAL (ref 9–20)
CALCIUM SERPL-MCNC: 9.7 MG/DL (ref 8.6–10.4)
CHLORIDE BLD-SCNC: 106 MMOL/L (ref 98–107)
CO2: 22 MMOL/L (ref 20–31)
CREAT SERPL-MCNC: 0.46 MG/DL (ref 0.7–1.2)
GFR AFRICAN AMERICAN: >60 ML/MIN
GFR NON-AFRICAN AMERICAN: >60 ML/MIN
GFR SERPL CREATININE-BSD FRML MDRD: ABNORMAL ML/MIN/{1.73_M2}
GFR SERPL CREATININE-BSD FRML MDRD: ABNORMAL ML/MIN/{1.73_M2}
GLUCOSE BLD-MCNC: 105 MG/DL (ref 70–99)
HCT VFR BLD CALC: 40.5 % (ref 40.7–50.3)
HEMOGLOBIN: 13.7 G/DL (ref 13–17)
MCH RBC QN AUTO: 32.6 PG (ref 25.2–33.5)
MCHC RBC AUTO-ENTMCNC: 33.8 G/DL (ref 28.4–34.8)
MCV RBC AUTO: 96.4 FL (ref 82.6–102.9)
NRBC AUTOMATED: 0 PER 100 WBC
PDW BLD-RTO: 11.3 % (ref 11.8–14.4)
PLATELET # BLD: 162 K/UL (ref 138–453)
PMV BLD AUTO: 11 FL (ref 8.1–13.5)
POTASSIUM SERPL-SCNC: 3.8 MMOL/L (ref 3.7–5.3)
RBC # BLD: 4.2 M/UL (ref 4.21–5.77)
SODIUM BLD-SCNC: 141 MMOL/L (ref 135–144)
WBC # BLD: 7.6 K/UL (ref 3.5–11.3)

## 2019-05-04 PROCEDURE — 71046 X-RAY EXAM CHEST 2 VIEWS: CPT

## 2019-05-04 PROCEDURE — 6370000000 HC RX 637 (ALT 250 FOR IP): Performed by: PHYSICIAN ASSISTANT

## 2019-05-04 PROCEDURE — 36415 COLL VENOUS BLD VENIPUNCTURE: CPT

## 2019-05-04 PROCEDURE — 85027 COMPLETE CBC AUTOMATED: CPT

## 2019-05-04 PROCEDURE — 80048 BASIC METABOLIC PNL TOTAL CA: CPT

## 2019-05-04 PROCEDURE — 6370000000 HC RX 637 (ALT 250 FOR IP): Performed by: UROLOGY

## 2019-05-04 RX ORDER — DOCUSATE SODIUM 100 MG/1
100 CAPSULE, LIQUID FILLED ORAL 2 TIMES DAILY
Qty: 60 CAPSULE | Refills: 0 | Status: SHIPPED | OUTPATIENT
Start: 2019-05-04 | End: 2019-06-03

## 2019-05-04 RX ORDER — TAMSULOSIN HYDROCHLORIDE 0.4 MG/1
0.4 CAPSULE ORAL DAILY
Qty: 30 CAPSULE | Refills: 0 | Status: SHIPPED | OUTPATIENT
Start: 2019-05-04 | End: 2021-06-10

## 2019-05-04 RX ORDER — CEPHALEXIN 500 MG/1
500 CAPSULE ORAL 3 TIMES DAILY
Qty: 9 CAPSULE | Refills: 0 | Status: SHIPPED | OUTPATIENT
Start: 2019-05-04 | End: 2019-05-07 | Stop reason: ALTCHOICE

## 2019-05-04 RX ADMIN — ATORVASTATIN CALCIUM 10 MG: 10 TABLET, FILM COATED ORAL at 09:03

## 2019-05-04 RX ADMIN — TAMSULOSIN HYDROCHLORIDE 0.4 MG: 0.4 CAPSULE ORAL at 09:03

## 2019-05-04 RX ADMIN — DOCUSATE SODIUM 100 MG: 100 CAPSULE, LIQUID FILLED ORAL at 09:03

## 2019-05-04 RX ADMIN — PANTOPRAZOLE SODIUM 20 MG: 20 TABLET, DELAYED RELEASE ORAL at 06:53

## 2019-05-04 RX ADMIN — HYDROCODONE BITARTRATE AND ACETAMINOPHEN 2 TABLET: 5; 325 TABLET ORAL at 03:45

## 2019-05-04 RX ADMIN — HYDROCODONE BITARTRATE AND ACETAMINOPHEN 2 TABLET: 5; 325 TABLET ORAL at 09:03

## 2019-05-04 ASSESSMENT — PAIN DESCRIPTION - LOCATION
LOCATION: FLANK
LOCATION: FLANK

## 2019-05-04 ASSESSMENT — PAIN SCALES - GENERAL
PAINLEVEL_OUTOF10: 2
PAINLEVEL_OUTOF10: 7
PAINLEVEL_OUTOF10: 2
PAINLEVEL_OUTOF10: 7

## 2019-05-04 ASSESSMENT — PAIN DESCRIPTION - ORIENTATION
ORIENTATION: RIGHT
ORIENTATION: RIGHT

## 2019-05-04 ASSESSMENT — PAIN DESCRIPTION - PAIN TYPE
TYPE: ACUTE PAIN
TYPE: SURGICAL PAIN

## 2019-05-04 ASSESSMENT — PAIN DESCRIPTION - DESCRIPTORS: DESCRIPTORS: ACHING

## 2019-05-04 NOTE — PROGRESS NOTES
Ky Guardado, [address] [facility], [address], [place], Gaurav Vergara  Urology Progress Note     Subjective:     No acute urologic events overnight. Denies chest pain, shortness of breath,  fevers, chills. + ambulating, + flatus, no bowel movements. Had R antegrade stent placed by IR yesterday. Pain improved. PVRS 300-500 cc.      Current Diet: DIET GENERAL;     Patient Vitals for the past 24 hrs:   BP Temp Temp src Pulse Resp SpO2   05/04/19 0348 (!) 143/75 98.8 °F (37.1 °C) Oral 70 14 94 %   05/03/19 2315 135/63 99.1 °F (37.3 °C) -- 74 14 92 %   05/03/19 2030 128/74 99 °F (37.2 °C) Oral 98 16 93 %   05/03/19 1645 (!) 152/76 98.7 °F (37.1 °C) -- 88 25 92 %   05/03/19 1616 (!) 170/90 -- -- 92 -- (!) 83 %   05/03/19 1603 -- -- -- 89 -- 93 %   05/03/19 1602 -- -- -- 89 -- 94 %   05/03/19 1601 (!) 149/85 -- -- 89 -- 92 %   05/03/19 1600 -- -- -- 92 -- 94 %   05/03/19 1559 -- -- -- 92 -- 94 %   05/03/19 1558 (!) 168/76 -- -- 92 -- 94 %   05/03/19 1555 (!) 167/78 -- -- 87 -- 95 %   05/03/19 1041 -- -- -- 83 16 95 %       Intake/Output Summary (Last 24 hours) at 5/4/2019 0719  Last data filed at 5/4/2019 7981  Gross per 24 hour   Intake --   Output 2375 ml   Net -2375 ml       Recent Labs     05/01/19  1843 05/02/19  0608 05/03/19  0600   WBC 10.8 12.1* 7.8   HGB 15.2 14.9 14.4   HCT 45.1 44.4 43.8   MCV 97.0 96.9 99.8    210 171       Additional Lab/culture results: none    Physical Exam:   NAD  AOx3  RRR  Respirations nonlabored, symmetric chest rise bilaterally  Abdomen Soft, NT, ND. R flank dressing C/D/I  No calf ttp bilaterally  EPC cuffs on and functioning billaterally    Interval Imaging Findings: None    Impression:  60-yo F POD#3 s/p R PCNL and s/p antegrade stent placement after failed clamp trial    Plan:   -Monitor PVRs  -Maintain ureteral stent, will need removal in 1-2 weeks vs ureteroscopy for residual stone burden   -PO pain control  -EPC, IS, ambulate    Walter Johnson MD  PGY-2, Urology    7:19 AM 5/4/2019

## 2019-05-04 NOTE — DISCHARGE SUMMARY
UROLOGY - DISCHARGE SUMMARY    Admission Date: 5/1/2019    Discharge Date:  5/4/2019    Admitting Provider: Sd Mars MD    Discharge Provider: Sd Mars MD    Primary Care Physician: Matilde Ryan MD    Primary Discharge Diagnosis:  Right renal calculus    Secondary Discharge Diagnosis:  Needed for pain control  Chronic kidney disease    Consultants:  Interventional radiology for antegrade stent placement    Discharge Disposition: Home    Follow-Up: 2 weeks for stent removal    Discharge Activity:    · Take prescriptions as directed. · No driving while taking narcotic pain medication. · Please call attending physician or hospital  with questions. · Please call or present to ED for fever >101 F, intractable nausea and vomiting, or uncontrolled pain. · OK to shower after discharge. · Do not resume aspirin for at least 1 week after discharge. · You may see blood in your urine the entire time your stent is in place. This is normal.   · Follow up with Dr. Ben Dave in 2 weeks for stent removal at [address]. Please call Dr. Maryjane Grant office on Monday to make appointment for stent removal in the operating room at [facility]. Mandie in 2 weeks. Discharge Diet: Regular    Discharge Medications:     Medication List      START taking these medications    cephALEXin 500 MG capsule  Commonly known as:  KEFLEX  Take 1 capsule by mouth 3 times daily for 3 days     docusate sodium 100 MG capsule  Commonly known as:  COLACE  Take 1 capsule by mouth 2 times daily     HYDROcodone-acetaminophen 5-325 MG per tablet  Commonly known as:  NORCO  Take 1 tablet by mouth every 6 hours as needed for Pain for up to 5 days. CHANGE how you take these medications    * tamsulosin 0.4 MG capsule  Commonly known as:  FLOMAX  What changed:  Another medication with the same name was added. Make sure you understand how and when to take each.      * tamsulosin 0.4 MG capsule  Commonly known as:  FLOMAX  Take 1 capsule by mouth fail a clamp trial of his percutaneous nephrostomy tube and due to this required antegrade stent placement by interventional radiology which was done on 5/3. To this, his pain improved significantly and he did well. He was discharged on 5/4 with scheduled follow-up for stent removal in 2 weeks. Patient was discharged in good and stable condition and was agreeable to discharge. Braden Wong MD  PGY-2, [address] Department of Urology

## 2019-05-04 NOTE — PLAN OF CARE
Problem: Falls - Risk of:  Goal: Will remain free from falls  Description  Will remain free from falls  5/4/2019 0333 by Kristan Aase, RN  Outcome: Ongoing     Problem: Falls - Risk of:  Goal: Absence of physical injury  Description  Absence of physical injury  5/4/2019 0333 by Kristan Aase, RN  Outcome: Ongoing     Problem: Pain:  Goal: Pain level will decrease  Description  Pain level will decrease  5/4/2019 0333 by Kristan Aase, RN  Outcome: Ongoing     Problem: Pain:  Goal: Control of acute pain  Description  Control of acute pain  5/4/2019 0333 by Kristan Aase, RN  Outcome: Ongoing     Problem: Pain:  Goal: Control of chronic pain  Description  Control of chronic pain  5/4/2019 0333 by Kristan Aase, RN  Outcome: Ongoing

## 2019-05-05 LAB
STONE COMPOSITION: NORMAL
STONE DESCRIPTION: NORMAL
STONE MASS: NORMAL MG
STONE NUMBER: NORMAL
STONE SIZE: NORMAL MM

## 2019-05-06 ENCOUNTER — HOSPITAL ENCOUNTER (EMERGENCY)
Age: 64
Discharge: HOME | End: 2019-05-07
Attending: EMERGENCY MEDICINE
Payer: COMMERCIAL

## 2019-05-06 ENCOUNTER — APPOINTMENT (OUTPATIENT)
Dept: CT IMAGING | Age: 64
End: 2019-05-06
Payer: COMMERCIAL

## 2019-05-06 VITALS
SYSTOLIC BLOOD PRESSURE: 148 MMHG | OXYGEN SATURATION: 98 % | RESPIRATION RATE: 16 BRPM | HEART RATE: 78 BPM | WEIGHT: 170 LBS | DIASTOLIC BLOOD PRESSURE: 76 MMHG | TEMPERATURE: 98.2 F | BODY MASS INDEX: 27.32 KG/M2 | HEIGHT: 66 IN

## 2019-05-06 DIAGNOSIS — R10.9 ACUTE RIGHT FLANK PAIN: ICD-10-CM

## 2019-05-06 DIAGNOSIS — N39.0 URINARY TRACT INFECTION WITH HEMATURIA, SITE UNSPECIFIED: Primary | ICD-10-CM

## 2019-05-06 DIAGNOSIS — R31.9 URINARY TRACT INFECTION WITH HEMATURIA, SITE UNSPECIFIED: Primary | ICD-10-CM

## 2019-05-06 LAB
-: ABNORMAL
ABSOLUTE EOS #: 0.26 K/UL (ref 0–0.44)
ABSOLUTE IMMATURE GRANULOCYTE: 0.02 K/UL (ref 0–0.3)
ABSOLUTE LYMPH #: 1.17 K/UL (ref 1.1–3.7)
ABSOLUTE MONO #: 0.71 K/UL (ref 0.1–1.2)
AMORPHOUS: ABNORMAL
ANION GAP SERPL CALCULATED.3IONS-SCNC: 12 MMOL/L (ref 9–17)
BACTERIA: ABNORMAL
BASOPHILS # BLD: 1 % (ref 0–2)
BASOPHILS ABSOLUTE: 0.05 K/UL (ref 0–0.2)
BILIRUBIN URINE: NEGATIVE
BUN BLDV-MCNC: 12 MG/DL (ref 8–23)
BUN/CREAT BLD: 21 (ref 9–20)
CALCIUM SERPL-MCNC: 11.2 MG/DL (ref 8.6–10.4)
CASTS UA: ABNORMAL /LPF
CASTS UA: ABNORMAL /LPF
CHLORIDE BLD-SCNC: 104 MMOL/L (ref 98–107)
CO2: 24 MMOL/L (ref 20–31)
COLOR: ABNORMAL
COMMENT UA: ABNORMAL
CREAT SERPL-MCNC: 0.56 MG/DL (ref 0.7–1.2)
CRYSTALS, UA: ABNORMAL /HPF
DIFFERENTIAL TYPE: ABNORMAL
EOSINOPHILS RELATIVE PERCENT: 4 % (ref 1–4)
EPITHELIAL CELLS UA: ABNORMAL /HPF (ref 0–5)
GFR AFRICAN AMERICAN: >60 ML/MIN
GFR NON-AFRICAN AMERICAN: >60 ML/MIN
GFR SERPL CREATININE-BSD FRML MDRD: ABNORMAL ML/MIN/{1.73_M2}
GFR SERPL CREATININE-BSD FRML MDRD: ABNORMAL ML/MIN/{1.73_M2}
GLUCOSE BLD-MCNC: 119 MG/DL (ref 70–99)
GLUCOSE URINE: NEGATIVE
HCT VFR BLD CALC: 46.4 % (ref 40.7–50.3)
HEMOGLOBIN: 15.6 G/DL (ref 13–17)
IMMATURE GRANULOCYTES: 0 %
INR BLD: 1
KETONES, URINE: NEGATIVE
LEUKOCYTE ESTERASE, URINE: ABNORMAL
LYMPHOCYTES # BLD: 19 % (ref 24–43)
MCH RBC QN AUTO: 32 PG (ref 25.2–33.5)
MCHC RBC AUTO-ENTMCNC: 33.6 G/DL (ref 28.4–34.8)
MCV RBC AUTO: 95.3 FL (ref 82.6–102.9)
MONOCYTES # BLD: 11 % (ref 3–12)
MUCUS: ABNORMAL
NITRITE, URINE: NEGATIVE
NRBC AUTOMATED: 0 PER 100 WBC
OTHER OBSERVATIONS UA: ABNORMAL
PARTIAL THROMBOPLASTIN TIME: 26.2 SEC (ref 23–31)
PDW BLD-RTO: 11.6 % (ref 11.8–14.4)
PH UA: 6 (ref 5–8)
PLATELET # BLD: 238 K/UL (ref 138–453)
PLATELET ESTIMATE: ABNORMAL
PMV BLD AUTO: 10.2 FL (ref 8.1–13.5)
POTASSIUM SERPL-SCNC: 4.4 MMOL/L (ref 3.7–5.3)
PROTEIN UA: ABNORMAL
PROTHROMBIN TIME: 10 SEC (ref 9.7–11.6)
RBC # BLD: 4.87 M/UL (ref 4.21–5.77)
RBC # BLD: ABNORMAL 10*6/UL
RBC UA: ABNORMAL /HPF (ref 0–2)
RENAL EPITHELIAL, UA: ABNORMAL /HPF
SEG NEUTROPHILS: 65 % (ref 36–65)
SEGMENTED NEUTROPHILS ABSOLUTE COUNT: 4.11 K/UL (ref 1.5–8.1)
SODIUM BLD-SCNC: 140 MMOL/L (ref 135–144)
SPECIFIC GRAVITY UA: 1.02 (ref 1–1.03)
TRICHOMONAS: ABNORMAL
TURBIDITY: ABNORMAL
URINE HGB: ABNORMAL
UROBILINOGEN, URINE: NORMAL
WBC # BLD: 6.3 K/UL (ref 3.5–11.3)
WBC # BLD: ABNORMAL 10*3/UL
WBC UA: ABNORMAL /HPF (ref 0–5)
YEAST: ABNORMAL

## 2019-05-06 PROCEDURE — 6360000004 HC RX CONTRAST MEDICATION: Performed by: EMERGENCY MEDICINE

## 2019-05-06 PROCEDURE — 2580000003 HC RX 258: Performed by: EMERGENCY MEDICINE

## 2019-05-06 PROCEDURE — 96375 TX/PRO/DX INJ NEW DRUG ADDON: CPT

## 2019-05-06 PROCEDURE — 87086 URINE CULTURE/COLONY COUNT: CPT

## 2019-05-06 PROCEDURE — 6360000002 HC RX W HCPCS: Performed by: EMERGENCY MEDICINE

## 2019-05-06 PROCEDURE — 81001 URINALYSIS AUTO W/SCOPE: CPT

## 2019-05-06 PROCEDURE — 96374 THER/PROPH/DIAG INJ IV PUSH: CPT

## 2019-05-06 PROCEDURE — 74177 CT ABD & PELVIS W/CONTRAST: CPT

## 2019-05-06 PROCEDURE — 99284 EMERGENCY DEPT VISIT MOD MDM: CPT

## 2019-05-06 PROCEDURE — 80048 BASIC METABOLIC PNL TOTAL CA: CPT

## 2019-05-06 PROCEDURE — 85730 THROMBOPLASTIN TIME PARTIAL: CPT

## 2019-05-06 PROCEDURE — 85025 COMPLETE CBC W/AUTO DIFF WBC: CPT

## 2019-05-06 PROCEDURE — 85610 PROTHROMBIN TIME: CPT

## 2019-05-06 RX ORDER — 0.9 % SODIUM CHLORIDE 0.9 %
80 INTRAVENOUS SOLUTION INTRAVENOUS ONCE
Status: COMPLETED | OUTPATIENT
Start: 2019-05-06 | End: 2019-05-06

## 2019-05-06 RX ORDER — ONDANSETRON 2 MG/ML
4 INJECTION INTRAMUSCULAR; INTRAVENOUS ONCE
Status: COMPLETED | OUTPATIENT
Start: 2019-05-06 | End: 2019-05-06

## 2019-05-06 RX ORDER — HYDROMORPHONE HYDROCHLORIDE 1 MG/ML
1 INJECTION, SOLUTION INTRAMUSCULAR; INTRAVENOUS; SUBCUTANEOUS ONCE
Status: COMPLETED | OUTPATIENT
Start: 2019-05-06 | End: 2019-05-06

## 2019-05-06 RX ORDER — SODIUM CHLORIDE 0.9 % (FLUSH) 0.9 %
10 SYRINGE (ML) INJECTION PRN
Status: DISCONTINUED | OUTPATIENT
Start: 2019-05-06 | End: 2019-05-07 | Stop reason: HOSPADM

## 2019-05-06 RX ADMIN — IOPAMIDOL 75 ML: 755 INJECTION, SOLUTION INTRAVENOUS at 23:33

## 2019-05-06 RX ADMIN — ONDANSETRON 4 MG: 2 INJECTION INTRAMUSCULAR; INTRAVENOUS at 22:44

## 2019-05-06 RX ADMIN — SODIUM CHLORIDE 80 ML: 9 INJECTION, SOLUTION INTRAVENOUS at 23:33

## 2019-05-06 RX ADMIN — HYDROMORPHONE HYDROCHLORIDE 1 MG: 1 INJECTION, SOLUTION INTRAMUSCULAR; INTRAVENOUS; SUBCUTANEOUS at 22:44

## 2019-05-06 RX ADMIN — Medication 10 ML: at 23:33

## 2019-05-06 ASSESSMENT — PAIN DESCRIPTION - LOCATION: LOCATION: ABDOMEN;FLANK

## 2019-05-06 ASSESSMENT — PAIN DESCRIPTION - FREQUENCY: FREQUENCY: CONTINUOUS

## 2019-05-06 ASSESSMENT — PAIN DESCRIPTION - PAIN TYPE: TYPE: ACUTE PAIN

## 2019-05-06 ASSESSMENT — PAIN SCALES - GENERAL
PAINLEVEL_OUTOF10: 6
PAINLEVEL_OUTOF10: 5

## 2019-05-06 ASSESSMENT — PAIN DESCRIPTION - PROGRESSION: CLINICAL_PROGRESSION: NOT CHANGED

## 2019-05-06 ASSESSMENT — PAIN DESCRIPTION - ORIENTATION: ORIENTATION: LEFT

## 2019-05-07 PROCEDURE — 6370000000 HC RX 637 (ALT 250 FOR IP): Performed by: EMERGENCY MEDICINE

## 2019-05-07 RX ORDER — CEPHALEXIN 500 MG/1
500 CAPSULE ORAL 2 TIMES DAILY
Qty: 14 CAPSULE | Refills: 0 | Status: SHIPPED | OUTPATIENT
Start: 2019-05-07 | End: 2019-05-14

## 2019-05-07 RX ORDER — CEPHALEXIN 500 MG/1
500 CAPSULE ORAL ONCE
Status: COMPLETED | OUTPATIENT
Start: 2019-05-07 | End: 2019-05-07

## 2019-05-07 RX ADMIN — CEPHALEXIN 500 MG: 500 CAPSULE ORAL at 01:14

## 2019-05-07 ASSESSMENT — ENCOUNTER SYMPTOMS
CHEST TIGHTNESS: 0
ABDOMINAL PAIN: 1
SHORTNESS OF BREATH: 0
ABDOMINAL DISTENTION: 0
EYE DISCHARGE: 0
EYE PAIN: 0
FACIAL SWELLING: 0
BACK PAIN: 0

## 2019-05-07 NOTE — ED PROVIDER NOTES
EMERGENCY DEPARTMENT ENCOUNTER    Pt Name: Radha Goff  MRN: [id number]  [place] 1955  Date of evaluation: 5/6/19  CHIEF COMPLAINT       Chief Complaint   Patient presents with    Hematoma     kidney surgery wed     HISTORY OF PRESENT ILLNESS   HPI   The patient is a 27-year-old gentleman who presented to the emergency department secondary to hematuria, abdominal pain and a bulge in his right lower abdomen. Patient is status post a ureteral stent placed secondary to a 2 cm kidney stone by Dr. Alfredo Paul at [facility]. Patient stated on since Friday he has had hematuria as well as pain in his right lower, he noted some bulging in his right lower quadrant. He denied hemoptysis. No other bruising. Patient denied chest pain, shortness of breath, nausea, vomiting, vision or chills. REVIEW OF SYSTEMS     Review of Systems   Constitutional: Negative for chills, diaphoresis and fever. HENT: Negative for congestion, ear pain and facial swelling. Eyes: Negative for pain, discharge and visual disturbance. Respiratory: Negative for chest tightness and shortness of breath. Cardiovascular: Negative for chest pain and palpitations. Gastrointestinal: Positive for abdominal pain. Negative for abdominal distention. Genitourinary: Positive for hematuria. Negative for difficulty urinating and flank pain. Musculoskeletal: Negative for back pain. Skin: Negative for wound. Neurological: Negative for dizziness, light-headedness and headaches. Hematological: Bruises/bleeds easily.      PASTMEDICAL HISTORY     Past Medical History:   Diagnosis Date    Arthritis     Chronic kidney disease     DDD (degenerative disc disease), cervical     lumbar    Difficulty urinating     Fracture lumbar vertebra-closed ([address]     chronic fracture to T11 T12    GERD (gastroesophageal reflux disease)     Hyperlipidemia     Kidney stone     Palpitations     Ureteral stone with hydronephrosis 10/27/2014 MG capsule Take 15 mg by mouth daily      Glucosamine-Chondroit-Vit C-Mn (GLUCOSAMINE 1500 COMPLEX PO) Take 1 tablet by mouth daily      senna (SENOKOT) 8.6 MG tablet Take 1 tablet by mouth daily      fluocinonide (LIDEX) 0.05 % cream Apply 1 oz topically nightly as needed Apply topically 2 times daily. , Topical, NIGHTLY PRN, Until Discontinued, Historical Med       !! - Potential duplicate medications found. Please discuss with provider. ALLERGIES     is allergic to nylon; other; and morphine. FAMILY HISTORY     indicated that the status of his mother is unknown. He indicated that the status of his father is unknown. He indicated that the status of his brother is unknown. SOCIAL HISTORY       Social History     Tobacco Use    Smoking status: Never Smoker    Smokeless tobacco: Never Used   Substance Use Topics    Alcohol use: Yes     Comment: rare    Drug use: No     PHYSICAL EXAM     INITIAL VITALS: BP (!) 148/76   Pulse 78   Temp 98.2 °F (36.8 °C) (Oral)   Resp 16   Ht 5' 6\" (1.676 m)   Wt 170 lb (77.1 kg)   SpO2 98%   BMI 27.44 kg/m²    Physical Exam   Constitutional: He is oriented to person, place, and time. He appears well-developed and well-nourished. HENT:   Head: Normocephalic and atraumatic. Eyes: Pupils are equal, round, and reactive to light. EOM are normal.   Neck: Normal range of motion. Neck supple. Cardiovascular: Normal rate and regular rhythm. Pulmonary/Chest: Effort normal and breath sounds normal.   Abdominal: Soft. Bowel sounds are normal.   Musculoskeletal: Normal range of motion. Neurological: He is alert and oriented to person, place, and time. Skin: Skin is warm. Capillary refill takes less than 2 seconds. Nursing note and vitals reviewed. MEDICAL DECISION MAKING:   Patient was seen and examined. The patient is a 29-year-old male  On recent stent placement presented to the emergency department secondary to right flank and abdominal pain.   Labs obtained no thrombocytopenia or anemia. Evidence of urinary tract infection. CT of the pelvis no evidence of retroperitoneal hematoma other abnormalities. .  Patient initiated on antibiotics. Urology contacted. Patient discussed with Dr. Ovi Branham over the patient's urologist was in agreement patient be discharged home with initiation of antibiotics. Patient adequate pain medication home discharged home with outpatient follow-up and parameters for return to the emergency department. CRITICAL CARE:              NIH STROKE SCALE:            PROCEDURES:    Procedures    DIAGNOSTIC RESULTS   EKG:All EKG's are interpreted by the Emergency Department Physician who either signs or Co-signs this chart in the absence of a cardiologist.        RADIOLOGY:All plain film, CT, MRI, and formal ultrasound images (except ED bedside ultrasound) are read by the radiologist, see reports below, unless otherwisenoted in MDM or here. CT ABDOMEN PELVIS W IV CONTRAST Additional Contrast? None   Final Result   Heterogeneous enhancement in the posterior right kidney with small amount of   ill-defined hemorrhage along the posterior right kidney consistent with   recently removed right nephrostomy tube. No extravasation of contrast   material from the right renal collecting system on delayed images. No   urinoma. Right ureteral stent in place. Mild right hydronephrosis. Right greater   than left nonobstructing renal calculi. No bowel obstruction. LABS: All lab results were reviewed by myself, and all abnormals are listed below.   Labs Reviewed   CBC WITH AUTO DIFFERENTIAL - Abnormal; Notable for the following components:       Result Value    RDW 11.6 (*)     Lymphocytes 19 (*)     All other components within normal limits   BASIC METABOLIC PANEL W/ REFLEX TO MG FOR LOW K - Abnormal; Notable for the following components:    Glucose 119 (*)     CREATININE 0.56 (*)     Bun/Cre Ratio 21 (*)     Calcium 11.2 (*) All other components within normal limits   URINALYSIS - Abnormal; Notable for the following components:    Turbidity UA CLOUDY (*)     Urine Hgb 3+ (*)     Protein, UA 2+ (*)     Leukocyte Esterase, Urine MOD (*)     All other components within normal limits   MICROSCOPIC URINALYSIS - Abnormal; Notable for the following components:    Bacteria, UA FEW (*)     All other components within normal limits   URINE CULTURE CLEAN CATCH   PROTIME-INR   APTT       EMERGENCY DEPARTMENTCOURSE:         Vitals:    Vitals:    05/06/19 2151 05/06/19 2152   BP: (!) 148/76    Pulse: 78    Resp: 16    Temp: 98.2 °F (36.8 °C)    TempSrc: Oral    SpO2: 98%    Weight:  170 lb (77.1 kg)   Height:  5' 6\" (1.676 m)       The patient was given the following medications while in the emergency department:  Orders Placed This Encounter   Medications    ondansetron (ZOFRAN) injection 4 mg    HYDROmorphone HCl PF (DILAUDID) injection 1 mg    0.9 % sodium chloride bolus    DISCONTD: sodium chloride flush 0.9 % injection 10 mL    iopamidol (ISOVUE-370) 76 % injection 75 mL    cephALEXin (KEFLEX) capsule 500 mg    cephALEXin (KEFLEX) 500 MG capsule     Sig: Take 1 capsule by mouth 2 times daily for 7 days     Dispense:  14 capsule     Refill:  0     CONSULTS:  None    FINAL IMPRESSION      1. Urinary tract infection with hematuria, site unspecified    2.  Acute right flank pain          DISPOSITION/PLAN   DISPOSITION        PATIENT REFERRED TO:  Tanika Edmonds MD  [facility]  [address] [phone]    Schedule an appointment as soon as possible for a visit       Kaylee Islas MD  [address] [place] [phone]          DISCHARGE MEDICATIONS:  Discharge Medication List as of 5/7/2019  1:42 AM      START taking these medications    Details   cephALEXin (KEFLEX) 500 MG capsule Take 1 capsule by mouth 2 times daily for 7 days, Disp-14 capsule, [id number]           Annalisa Nassar MD  Attending

## 2019-05-07 NOTE — ED NOTES
Patient presents to the ED with complaints of hematoma. Patient states that he had a lithotripsy and ureteral stent placed on Wednesday and was discharged home on Saturday but developed a large hematoma in the right flank area. Patient is alert and oriented, respirations are even and unlabored, abdomen is soft with hematoma, incisions on back are covered with clean dry and intact bandaid.       Radha Rahman RN  05/06/19 4177

## 2019-05-08 LAB
CULTURE: NO GROWTH
Lab: NORMAL
SPECIMEN DESCRIPTION: NORMAL

## 2020-08-17 NOTE — PROGRESS NOTES
[address] [place] [address]  Inguinal Hernia Outpatient Evaluation    PATIENT NAME: Peter Antoine   MRN NUMBER: [id number]  YOB: 1955  PHONE NUMBER: [phone]  PRIMARY CARE PHYSICIAN: Chepe Reid MD  TODAY'S DATE: 8/18/2020    SUBJECTIVE:     Chief Complaint: Pain and bulging left groin    History of Present Illness: The patient is a 62 y.o. male  who presents with a one-year history of having intermittent pains and a small lump in the left groin. This is only been problematic for him  While exercising or with coughing or straining. He has a history of several prior hernia repairs. He had a hernia and an old appendectomy scar that was repaired many years ago by Dr. Cookie Goodman. He also had a right inguinal hernia repaired by Dr. Hipolito Elder about 2008. I repaired an umbilical hernia for him in 2016. All of those repairs are still intact. He presents now with left inguinal hernia as described above which is slowly becoming more symptomatic but has never had any episodes of incarceration. He has a history of neurogenic bladder secondary to back injury from trauma at age 22. He is on chronic pain medication for the back pain (tramadol) and is on Flomax for his urinary problems. He has never had to do intermittent self-catheterization but sometimes he does suprapubic compressions to help with voiding. He comes at this time regarding repair of his symptomatic left inguinal hernia. .  Review of Systems:  Review of Systems   Constitutional: Negative for activity change, chills and fever. HENT: Negative for congestion, rhinorrhea and sore throat. Eyes: Negative for visual disturbance. Respiratory: Negative for cough, shortness of breath and wheezing. Cardiovascular: Negative for chest pain, palpitations and leg swelling. Gastrointestinal: Negative for abdominal distention, abdominal pain, constipation and diarrhea.    Genitourinary: Positive for difficulty urinating (neurogenic rare       Medications:     Current Outpatient Medications:     tamsulosin (FLOMAX) 0.4 MG capsule, Take 1 capsule by mouth daily Take while stent in place. , Disp: 30 capsule, Rfl: 0    traMADol (ULTRAM) 50 MG tablet, Take 50 mg by mouth every 6 hours as needed for Pain., Disp: , Rfl:     tamsulosin (FLOMAX) 0.4 MG capsule, Take 0.4 mg by mouth daily, Disp: , Rfl:     ferrous sulfate 325 (65 Fe) MG tablet, Take 325 mg by mouth daily (with breakfast), Disp: , Rfl:     Cholecalciferol (VITAMIN D3) 10815 units CAPS, Take 1 capsule by mouth, Disp: , Rfl:     Biotin 1000 MCG TABS, Take 5,000 mcg by mouth daily, Disp: , Rfl:     acetaminophen (TYLENOL) 500 MG tablet, Take 1,000 mg by mouth every 6 hours as needed for Pain, Disp: , Rfl:     atorvastatin (LIPITOR) 10 MG tablet, Take 10 mg by mouth daily, Disp: , Rfl:     lansoprazole (PREVACID) 15 MG capsule, Take 15 mg by mouth daily, Disp: , Rfl:     Glucosamine-Chondroit-Vit C-Mn (GLUCOSAMINE 1500 COMPLEX PO), Take 1 tablet by mouth daily, Disp: , Rfl:     senna (SENOKOT) 8.6 MG tablet, Take 1 tablet by mouth daily, Disp: , Rfl:     fluocinonide (LIDEX) 0.05 % cream, Apply 1 oz topically nightly as needed Apply topically 2 times daily. , Disp: , Rfl:     Allergies:    Nylon; Other; and Morphine    OBJECTIVE:     Vitals:    /87   Pulse 76   Ht 5' 6\" (1.676 m)   Wt 180 lb (81.6 kg)   SpO2 97%   BMI 29.05 kg/m²  Body mass index is 29.05 kg/m². Physical Exam:    GENERAL APPEARANCE: awake, alert,slightly overweight, non-obese 62y.o. year old male, well-oriented, and in no acute distress  ENT: sclerae are clear and white without icterus, oropharynx shows patient has his own teeth with good dental hygiene, no erythema or masses. NECK: Neck is free of lymphadenopathy or thyroid masses. LUNGS: clear, equal breath sounds bilaterally without rales, rhonchi, or wheezes.   CARDIO: S1 and S2 are within normal limits, regular rate and rhythm, no murmurs, no jugular venous distention and no ankle edema. ABDOMEN: the abdomen is soft without organomegaly, masses or tenderness. A scar is noted in the right lower quadrant consistent with an appendectomy scar. No evidence of hernias within this wound. Also there is a scar along the supraumbilical skin fold from prior umbilical hernia repair without evidence of any recurrent umbilical hernia. There is no diastases and no evidence of any other midline hernias. INGUINAL: Both groins were examined supine and upright. On the left there is a small inguinal hernia which is appreciable on upright exam only. This presents as a small soft bulge up near the internal ring. With coughing and straining the bulge enlarges and migrates partially down the inguinal canal but does not extend beyond the external ring. This is easily reducible. On the right there is a scar from previous repair. There is no evidence of recurrent hernia in the area appears very firm  To examination. GENITOURINARY: Normal circumcised male. Both testicles are in the scrotum bilaterally without masses or tenderness. RECTAL: MARÍA ELENA not preformed  BACK: Scars from surgery are noted. There is lack of normal lumbar lordosis. No scoliosis is present. EXTREMITIES: no deformity or edema noted. ASSESSMENT:     Assessment:  1. Left inguinal hernia, small but symptomatic as described above. I have recommended repair to him. He has viewed our educational video regarding the anterior mesh approach for repair of inguinal hernia and is agreeable to proceed in that fashion. I have strongly recommended a local anesthesia approach because of his neurogenic bladder in an attempt to avoid necessity for postoperative urinary bladder catheterization. Patient understands this issue well and agrees also. 2. Consent form was discussed and signed with a good understanding.     3.  The patient was counseled at length about the risks of mikayla Covid-19 during their perioperative period and any recovery window from their procedure. The patient was made aware that mikayla Covid-19  may worsen their prognosis for recovering from their procedure  and lend to a higher morbidity and/or mortality risk. All material risks, benefits, and reasonable alternatives including postponing the procedure were discussed. The patient does wish to proceed with the procedure at this time. Active Hospital Problems    Diagnosis Date Noted    Neurogenic bladder [N31.9] 08/18/2020    Left inguinal hernia [K40.90] 08/18/2020    Chronic midline low back pain [M54.5, G89.29] 08/29/2016       PLAN:     1. The plan will be for repair of left inguinal hernia as an outpatient under local anesthesia with IV sedation. 2.  The patient will continue on his p.o. tramadol for postoperative pain control with supplementation using additional oral ibuprofen. Electronically signed by Meghann Carlson MD    This note was created with the assistance of a speech-recognition program.  Although the intention is to generate a document that actually reflects the content of the visit, no guarantees can be provided that every mistake has been identified and corrected by editing.

## 2020-08-18 ENCOUNTER — HOSPITAL ENCOUNTER (OUTPATIENT)
Dept: SURGERY | Age: 65
Discharge: HOME | End: 2020-08-18
Payer: COMMERCIAL

## 2020-08-18 VITALS
WEIGHT: 180 LBS | HEIGHT: 66 IN | BODY MASS INDEX: 28.93 KG/M2 | HEART RATE: 76 BPM | SYSTOLIC BLOOD PRESSURE: 134 MMHG | DIASTOLIC BLOOD PRESSURE: 87 MMHG | OXYGEN SATURATION: 97 %

## 2020-08-18 PROBLEM — N31.9 NEUROGENIC BLADDER: Chronic | Status: ACTIVE | Noted: 2020-08-18

## 2020-08-18 PROBLEM — K40.90 LEFT INGUINAL HERNIA: Chronic | Status: ACTIVE | Noted: 2020-08-18

## 2020-08-18 PROCEDURE — 99201 HC NEW PT, OUTPT VISIT LEVEL 1: CPT

## 2020-08-18 PROCEDURE — 99204 OFFICE O/P NEW MOD 45 MIN: CPT | Performed by: SURGERY

## 2020-08-18 ASSESSMENT — ENCOUNTER SYMPTOMS
BACK PAIN: 1
SHORTNESS OF BREATH: 0
ABDOMINAL PAIN: 0
COUGH: 0
WHEEZING: 0
SORE THROAT: 0
CONSTIPATION: 0
DIARRHEA: 0
RHINORRHEA: 0
ABDOMINAL DISTENTION: 0

## 2020-08-27 NOTE — H&P
Meghann Carlson MD    Physician    General Surgery    Progress Notes    Signed    Date of Service:  8/18/2020  3:30 PM          Related encounter: Preop from 8/18/2020 in [id number]  Inguinal Hernia Outpatient Evaluation     PATIENT NAME: Susanna Neumann   MRN NUMBER: [id number]  YOB: 1955  PHONE NUMBER: [phone]  PRIMARY CARE PHYSICIAN: Cheryl Shay MD  TODAY'S DATE: 8/18/2020     SUBJECTIVE:      Chief Complaint: Pain and bulging left groin     History of Present Illness: The patient is a 62 y.o. male  who presents with a one-year history of having intermittent pains and a small lump in the left groin. Symptoms have been present only with coughing or straining or standing on his feet for long period of time. The symptoms however seem to be more frequent lately. He has a history of several prior hernia repairs. He had a hernia in an old appendectomy scar that was repaired many years ago by Dr. Lourdes Campos. He also had a right inguinal hernia repaired by Dr. Carolyn Salazar about 2008. I repaired an umbilical hernia for him in 2016. All of those repairs are still intact.     He presents now with left inguinal hernia as described above which is slowly becoming more symptomatic but has never had any episodes of incarceration. He has a history of neurogenic bladder secondary to back injury from trauma at age 22. He is on chronic pain medication for the back pain (tramadol) and is on Flomax for his urinary problems. He has never had to do intermittent self-catheterization but sometimes he does suprapubic compressions to help with voiding.     He comes at this time regarding repair of his symptomatic left inguinal hernia. .  Review of Systems:  Review of Systems   Constitutional: Negative for activity change, chills and fever. HENT: Negative for congestion, rhinorrhea and sore throat.     Eyes: Negative wire to bladder           Family History:   Family History         Family History   Problem Relation Age of Onset    Arthritis Mother      Other Mother           dementia    Other Father           dementia    Heart Disease Brother           quadruple bypass 10 years ago           Social History:   Social History            Tobacco Use    Smoking status: Never Smoker    Smokeless tobacco: Never Used   Substance Use Topics    Alcohol use: Yes       Comment: rare        Medications:   Current Medication     Current Outpatient Medications:     tamsulosin (FLOMAX) 0.4 MG capsule, Take 1 capsule by mouth daily Take while stent in place. , Disp: 30 capsule, Rfl: 0    traMADol (ULTRAM) 50 MG tablet, Take 50 mg by mouth every 6 hours as needed for Pain., Disp: , Rfl:     tamsulosin (FLOMAX) 0.4 MG capsule, Take 0.4 mg by mouth daily, Disp: , Rfl:     ferrous sulfate 325 (65 Fe) MG tablet, Take 325 mg by mouth daily (with breakfast), Disp: , Rfl:     Cholecalciferol (VITAMIN D3) 06808 units CAPS, Take 1 capsule by mouth, Disp: , Rfl:     Biotin 1000 MCG TABS, Take 5,000 mcg by mouth daily, Disp: , Rfl:     acetaminophen (TYLENOL) 500 MG tablet, Take 1,000 mg by mouth every 6 hours as needed for Pain, Disp: , Rfl:     atorvastatin (LIPITOR) 10 MG tablet, Take 10 mg by mouth daily, Disp: , Rfl:     lansoprazole (PREVACID) 15 MG capsule, Take 15 mg by mouth daily, Disp: , Rfl:     Glucosamine-Chondroit-Vit C-Mn (GLUCOSAMINE 1500 COMPLEX PO), Take 1 tablet by mouth daily, Disp: , Rfl:     senna (SENOKOT) 8.6 MG tablet, Take 1 tablet by mouth daily, Disp: , Rfl:     fluocinonide (LIDEX) 0.05 % cream, Apply 1 oz topically nightly as needed Apply topically 2 times daily. , Disp: , Rfl:         Allergies:    Nylon;  Other; and Morphine     OBJECTIVE:      Vitals:    /87   Pulse 76   Ht 5' 6\" (1.676 m)   Wt 180 lb (81.6 kg)   SpO2 97%   BMI 29.05 kg/m²  Body mass index is 29.05 kg/m².     Physical Exam: GENERAL APPEARANCE: awake, alert,slightly overweight, non-obese 62y.o. year old male, well-oriented, and in no acute distress  ENT: sclerae are clear and white without icterus, oropharynx shows patient has his own teeth with good dental hygiene, no erythema or masses. NECK: Neck is free of lymphadenopathy or thyroid masses. LUNGS: clear, equal breath sounds bilaterally without rales, rhonchi, or wheezes. CARDIO: S1 and S2 are within normal limits, regular rate and rhythm, no murmurs, no jugular venous distention and no ankle edema. ABDOMEN: the abdomen is soft without organomegaly, masses or tenderness. A scar is noted in the right lower quadrant consistent with an appendectomy scar. No evidence of hernias within this wound. Also there is a scar along the supraumbilical skin fold from prior umbilical hernia repair without evidence of any recurrent umbilical hernia. There is no diastases and no evidence of any other midline hernias. INGUINAL: Both groins were examined supine and upright. On the left there is a small inguinal hernia which is appreciable on upright exam only. This presents as a small soft bulge up near the internal ring. With coughing and straining the bulge enlarges and migrates partially down the inguinal canal but does not extend beyond the external ring. This is easily reducible.     On the right there is a scar from previous repair. There is no evidence of recurrent hernia in the area appears very firm  To examination.       GENITOURINARY: Normal circumcised male. Both testicles are in the scrotum bilaterally without masses or tenderness. RECTAL: MARÍA ELENA not preformed  BACK: Scars from surgery are noted. There is lack of normal lumbar lordosis. No scoliosis is present. EXTREMITIES: no deformity or edema noted.           ASSESSMENT:      Assessment:  1. Left inguinal hernia, small but symptomatic as described above. I have recommended repair to him.   He has viewed our educational video regarding the anterior mesh approach for repair of inguinal hernia and is agreeable to proceed in that fashion. I have strongly recommended a local anesthesia approach because of his neurogenic bladder in an attempt to avoid necessity for postoperative urinary bladder catheterization. Patient understands this issue well and agrees also. 2. Consent form was discussed and signed with a good understanding.     3. The patient was counseled at length about the risks of mikayla Covid-19 during their perioperative period and any recovery window from their procedure.  The patient was made aware that mikayla Covid-19  may worsen their prognosis for recovering from their procedure  and lend to a higher morbidity and/or mortality risk.  All material risks, benefits, and reasonable alternatives including postponing the procedure were discussed. The patient does wish to proceed with the procedure at this time.                   Active Hospital Problems     Diagnosis Date Noted    Neurogenic bladder [N31.9] 08/18/2020    Left inguinal hernia [K40.90] 08/18/2020    Chronic midline low back pain [M54.5, G89.29] 08/29/2016        PLAN:      1. The plan will be for repair of left inguinal hernia as an outpatient under local anesthesia with IV sedation.   2.  The patient will continue on his p.o. tramadol for postoperative pain control with supplementation using additional oral ibuprofen.           Electronically signed by Meghann Carlson MD     This note was created with the assistance of a speech-recognition program.  Although the intention is to generate a document that actually reflects the content of the visit, no guarantees can be provided that every mistake has been identified and corrected by editing.

## 2020-09-01 ENCOUNTER — HOSPITAL ENCOUNTER (OUTPATIENT)
Age: 65
Setting detail: OUTPATIENT SURGERY
Discharge: HOME | End: 2020-09-01
Attending: SURGERY | Admitting: SURGERY
Payer: COMMERCIAL

## 2020-09-01 VITALS
BODY MASS INDEX: 28.12 KG/M2 | OXYGEN SATURATION: 97 % | SYSTOLIC BLOOD PRESSURE: 123 MMHG | TEMPERATURE: 97.5 F | HEIGHT: 66 IN | WEIGHT: 175 LBS | RESPIRATION RATE: 12 BRPM | HEART RATE: 57 BPM | DIASTOLIC BLOOD PRESSURE: 81 MMHG

## 2020-09-01 PROBLEM — K40.90 LEFT INGUINAL HERNIA: Chronic | Status: RESOLVED | Noted: 2020-08-18 | Resolved: 2020-09-01

## 2020-09-01 PROCEDURE — 7100000010 HC PHASE II RECOVERY - FIRST 15 MIN: Performed by: SURGERY

## 2020-09-01 PROCEDURE — 49505 PRP I/HERN INIT REDUC >5 YR: CPT | Performed by: SURGERY

## 2020-09-01 PROCEDURE — 2709999900 HC NON-CHARGEABLE SUPPLY: Performed by: SURGERY

## 2020-09-01 PROCEDURE — 99153 MOD SED SAME PHYS/QHP EA: CPT | Performed by: SURGERY

## 2020-09-01 PROCEDURE — 7100000011 HC PHASE II RECOVERY - ADDTL 15 MIN: Performed by: SURGERY

## 2020-09-01 PROCEDURE — 6360000002 HC RX W HCPCS: Performed by: SURGERY

## 2020-09-01 PROCEDURE — C1781 MESH (IMPLANTABLE): HCPCS | Performed by: SURGERY

## 2020-09-01 PROCEDURE — 2580000003 HC RX 258: Performed by: SURGERY

## 2020-09-01 PROCEDURE — 3600000002 HC SURGERY LEVEL 2 BASE: Performed by: SURGERY

## 2020-09-01 PROCEDURE — 3600000012 HC SURGERY LEVEL 2 ADDTL 15MIN: Performed by: SURGERY

## 2020-09-01 PROCEDURE — 99152 MOD SED SAME PHYS/QHP 5/>YRS: CPT | Performed by: SURGERY

## 2020-09-01 PROCEDURE — 2500000003 HC RX 250 WO HCPCS: Performed by: SURGERY

## 2020-09-01 DEVICE — MESH HERN W10XL15CM FLAT MACRO X3 STD DISPOSABLE PARIETENE: Type: IMPLANTABLE DEVICE | Site: INGUINAL | Status: FUNCTIONAL

## 2020-09-01 RX ORDER — CEFAZOLIN SODIUM 2 G/50ML
2 SOLUTION INTRAVENOUS
Status: DISCONTINUED | OUTPATIENT
Start: 2020-09-01 | End: 2020-09-01 | Stop reason: HOSPADM

## 2020-09-01 RX ORDER — OXYCODONE HYDROCHLORIDE AND ACETAMINOPHEN 5; 325 MG/1; MG/1
1 TABLET ORAL EVERY 6 HOURS PRN
Qty: 16 TABLET | Refills: 0 | Status: SHIPPED | OUTPATIENT
Start: 2020-09-01 | End: 2020-09-06

## 2020-09-01 RX ORDER — KETOROLAC TROMETHAMINE 15 MG/ML
15 INJECTION, SOLUTION INTRAMUSCULAR; INTRAVENOUS ONCE
Status: COMPLETED | OUTPATIENT
Start: 2020-09-01 | End: 2020-09-01

## 2020-09-01 RX ORDER — SODIUM CHLORIDE, SODIUM LACTATE, POTASSIUM CHLORIDE, CALCIUM CHLORIDE 600; 310; 30; 20 MG/100ML; MG/100ML; MG/100ML; MG/100ML
INJECTION, SOLUTION INTRAVENOUS CONTINUOUS
Status: DISCONTINUED | OUTPATIENT
Start: 2020-09-01 | End: 2020-09-01 | Stop reason: HOSPADM

## 2020-09-01 RX ORDER — MIDAZOLAM HYDROCHLORIDE 1 MG/ML
INJECTION INTRAMUSCULAR; INTRAVENOUS PRN
Status: DISCONTINUED | OUTPATIENT
Start: 2020-09-01 | End: 2020-09-01 | Stop reason: ALTCHOICE

## 2020-09-01 RX ORDER — FENTANYL CITRATE 50 UG/ML
INJECTION, SOLUTION INTRAMUSCULAR; INTRAVENOUS PRN
Status: DISCONTINUED | OUTPATIENT
Start: 2020-09-01 | End: 2020-09-01 | Stop reason: ALTCHOICE

## 2020-09-01 RX ADMIN — KETOROLAC TROMETHAMINE 15 MG: 15 INJECTION, SOLUTION INTRAMUSCULAR; INTRAVENOUS at 10:52

## 2020-09-01 RX ADMIN — SODIUM CHLORIDE, POTASSIUM CHLORIDE, SODIUM LACTATE AND CALCIUM CHLORIDE: 600; 310; 30; 20 INJECTION, SOLUTION INTRAVENOUS at 06:42

## 2020-09-01 ASSESSMENT — PAIN SCALES - GENERAL
PAINLEVEL_OUTOF10: 3
PAINLEVEL_OUTOF10: 0
PAINLEVEL_OUTOF10: 0
PAINLEVEL_OUTOF10: 1
PAINLEVEL_OUTOF10: 0

## 2020-09-01 ASSESSMENT — PAIN - FUNCTIONAL ASSESSMENT
PAIN_FUNCTIONAL_ASSESSMENT: ACTIVITIES ARE NOT PREVENTED
PAIN_FUNCTIONAL_ASSESSMENT: 0-10

## 2020-09-01 ASSESSMENT — PAIN DESCRIPTION - PAIN TYPE: TYPE: SURGICAL PAIN

## 2020-09-01 ASSESSMENT — PAIN DESCRIPTION - LOCATION: LOCATION: GROIN

## 2020-09-01 ASSESSMENT — PAIN DESCRIPTION - FREQUENCY: FREQUENCY: CONTINUOUS

## 2020-09-01 ASSESSMENT — PAIN DESCRIPTION - DESCRIPTORS: DESCRIPTORS: DULL

## 2020-09-01 ASSESSMENT — PAIN DESCRIPTION - ONSET: ONSET: ON-GOING

## 2020-09-01 ASSESSMENT — PAIN DESCRIPTION - ORIENTATION: ORIENTATION: LEFT

## 2020-09-01 NOTE — OP NOTE
OPERATIVE NOTE    Name: Enriqueta Workman  Age: 62 y.o. MRN: [id number]   PCP: Ross Combs MD  Date: 9/1/2020          PREOPERATIVE DIAGNOSIS :Left  inguinal hernia. POSTOPERATIVE DIAGNOSIS:Left inguinal hernia combined direct and indirect, [id number]    PROCEDURE:RepairLeft inguinal hernia with preperitoneal mesh. Surgeon: Aminata Hubbard MD     Assistant:Danay Garcia    ANESTHESIA: Local with IV sedation. COMPLICATIONS: None    Estimated Blood Loss:  5 ml    Replaced: 800 ml LR    Specimen(s): were not taken    INDICATIONS AND CONSENT: This is a 62 y.o.  male  who has noticed a bulge in the Left groin for approximately 1 year. Symptoms have just recently been causing him increased  discomfort. Repair has been recommended to him. He understands the indications as well as the risks and agrees to the procedure. DETAILS OF THE PROCEDURE: With the patient supine on the operating table, the Left lower abdomen and groin were prepped and draped in the usual sterile fashion. A field block of local anesthesia was instituted using a mixture of 0.5% lidocaine with epinephrine and 0.25% bupivacaine plain mixed in equal portions. An oblique skin incision was made over the area of the inguinal canal and carried down through the subcutaneous tissue and Debbi's fascia until the external ring and external oblique could be demonstrated by sharp dissection. Additional injections of local anesthetic were made directly into the canal and then the external oblique was opened in the line of its fibers down through the external ring. The ilioinguinal nerve was identified. It was then dissected sharply free from the cord and retracted laterally out of the field. .  By sharp dissection, the cord was freed from the confines of the canal and elevated at the level of the pubic tubercle and the elevation taken up to the internal ring.  The cremasteric fascia was opened and dissection of the cord revealed an indirect hernia with bulging in the base of the cord and a hernia sac surrounded by a fair amount of preperitoneal fat on the anterior medial surface of the cord structures. . The sac was dissected sharply free from the vas and vessels and inverted back into the preperitoneal space without ligation or division. The surrounding preperitoneal fat was excised. The internal ring was estimated at 3 cm in size admitting 2 fingers easily. .  The floor of the canal additionally showed a diverticular type defect in the midportion with a small opening in the transversalis fascia that was just slightly smaller than my large finger. This is estimated at 1 cm in size. A preperitoneal mesh repair was performed. Preparatory to this, the cord was skeletonized, dividing the cremasteric fascia with the cautery and the external spermatic vessels and the accompanying genital branch of the genitofemoral nerve between hemostats and ties with 3-0 Vicryl. The floor of the canal was opened through the midportion of the direct defect, opening from the inferior epigastric vessels down to the pubic tubercle sharply and the entire myopectineal orifice was then dissected working through the floor of the canal using a combination of blunt and sharp dissection and judicious use of the cautery for hemostasis. The overlying attenuated transversalis fascia and the area of the direct defect was carefully  from underlying fat and trimmed somewhat prior to repair. The iliohypogastric nerve was  identified, was located far medial on the surface of the internal oblique and was easily avoided. The repair was then performed by placing a 10 x 15 cm piece of Parietene mesh  into the preperitoneal space. This was anchored near the pubic tubercle with a mattress suture of 2-0 Prolene. Laterally, a slit was placed in this mesh and the 2 tails were passed around the cord and reapproximated with 2-0 PDS.  This lateral portion of the mesh then was passed over the inferior epigastric vessels and back into the preperitoneal space through the internal ring. The mesh and the wound were then irrigated with saline solution containing gentamicin. The floor of the canal was  closed over the mesh after the manner of a Shouldice repair. Starting with the 2-0 Prolene previously placed a running imbricating layer of transversalis fascia medially  was sutured to the iliopubic tract laterally, coming out to the internal ring until a tight closure had been performed, then reversing the suture and suturing the free edge that was thus created to the shelving portion of the inguinal ligament, running down to the pubic tubercle and tying. A 2nd suture of 2-0 Prolene was then started up near the internal ring, suturing the internal oblique to the shelving portion of the inguinal ligament, running down to the pubic tubercle, reversing the suture and coming back up to the internal ring and tying. The cord and the ilioinguinal nerve were placed back into the canal and then after further irrigations with the saline and gentamicin solution, the external oblique was closed over the cord with running suture of 2-0 PDS. Debbi's fascia was closed with interrupted sutures of 3-0 Monocryl and the skin was closed with a combination of interrupted subcuticular sutures of 4-0 Monocryl and dermal adhesive. The patient tolerated the procedure well and was transferred to the postoperative care area stable and in good condition with plans to be discharged to home later the same day. The patient did receive appropriate prophylactic antibiotics (Ancef 2 g IV) before the operation and does not require any after the operation. The patient was treated for VTE prophylaxis with IPC cuffs and does not require VTE prophylaxis after the operation.     Electronically signed by Lori Lozano MD on 9/1/2020 at 9:14 AM

## 2020-09-01 NOTE — INTERVAL H&P NOTE
Update History & Physical    The patient's History and Physical of August 18, 2020 was reviewed with the patient and I examined the patient. There was no change. The surgical site was confirmed by the patient and me. Plan: The risks, benefits, expected outcome, and alternative to the recommended procedure have been discussed with the patient. Patient understands and wants to proceed with the procedure.      Electronically signed by Samuel Carlson MD on 9/1/2020 at 7:13 AM

## 2020-09-08 ENCOUNTER — HOSPITAL ENCOUNTER (OUTPATIENT)
Dept: SURGERY | Age: 65
Discharge: HOME | End: 2020-09-08
Payer: COMMERCIAL

## 2020-09-08 VITALS
OXYGEN SATURATION: 96 % | BODY MASS INDEX: 29.25 KG/M2 | WEIGHT: 182 LBS | HEIGHT: 66 IN | DIASTOLIC BLOOD PRESSURE: 83 MMHG | HEART RATE: 67 BPM | SYSTOLIC BLOOD PRESSURE: 136 MMHG

## 2020-09-08 PROCEDURE — 99024 POSTOP FOLLOW-UP VISIT: CPT | Performed by: SURGERY

## 2020-09-08 PROCEDURE — 99211 OFF/OP EST MAY X REQ PHY/QHP: CPT

## 2020-09-08 NOTE — PROGRESS NOTES
[address]  Post-op Hernia Note    PATIENT NAME: Elder Barthel   MRN NUMBER: [id number]  YOB: 1955  PHONE NUMBER: [phone]  PRIMARY CARE PHYSICIAN: Barbara Sanabria MD  TODAY'S DATE: 9/8/2020    SUBJECTIVE:     Chief Complaint: First post op visit s/p repair left inguinal hernia. L2M1F0. Loc/IV sed    History of Present Illness: Mr. Merritt Collado comes for his first postoperative visit 1 week after repair of his left inguinal hernia. He has been doing very well at home. He tells me he took a total of 3 Percocet and then switched back to his chronic dose of tramadol for pain relief. He has had no problems with voiding or constipation. He reports no wound problems. He has been complying with his 10 pound lifting restriction. He states that he is anxious to become more active.     Date of Surgery: 9/1/2020    Past Medical History:    Past Medical History:   Diagnosis Date    Arthritis     Chronic kidney disease     DDD (degenerative disc disease), cervical     lumbar    Difficulty urinating     Fracture lumbar vertebra-closed ([address]     chronic fracture to T11 T12    GERD (gastroesophageal reflux disease)     Hyperlipidemia     Kidney stone     Palpitations     Ureteral stone with hydronephrosis 10/27/2014       Past Surgical History:    Past Surgical History:   Procedure Laterality Date    APPENDECTOMY      CARPAL TUNNEL RELEASE Right     7/29/16    COLONOSCOPY      CRANIOTOMY      CYSTOSCOPY  10/28/14    ureteroscopy, left ureteral stent placement    CYSTOSCOPY Right 5/1/2019    CYSTOSCOPY, INSERTION OCCLUSIVE BALLOON, PT GOING TO INTERVENTIONAL RAD performed by Rolando Monzon MD at [place] [address], COLON, DIAGNOSTIC      HERNIA REPAIR      ingenal, umbilical    HERNIA REPAIR  29/62/7075    umbilical    HERNIA REPAIR Left 9/1/2020    LEFT HERNIA INGUINAL REPAIR WITH MESH performed by Dwight Mitchell MD at [address] [place] [address] LITHOTRIPSY      NEPHROLITHOTOMY Right 5/1/2019    NEPHROLITHOTOMY PERCUTANEOUS, LITHOCLAST, C-ARM performed by Elizabeth Carpio MD at [facility] NEPHROSTOMY Right 05/01/2019     NEPHROLITHOTOMY PERCUTANEOUS, LITHOCLAST    OTHER SURGICAL HISTORY Right 05/01/2019    wire to bladder       Medications:     Current Outpatient Medications:     tamsulosin (FLOMAX) 0.4 MG capsule, Take 1 capsule by mouth daily Take while stent in place. , Disp: 30 capsule, Rfl: 0    tamsulosin (FLOMAX) 0.4 MG capsule, Take 0.4 mg by mouth daily, Disp: , Rfl:     ferrous sulfate 325 (65 Fe) MG tablet, Take 325 mg by mouth daily (with breakfast), Disp: , Rfl:     Biotin 1000 MCG TABS, Take 5,000 mcg by mouth daily, Disp: , Rfl:     atorvastatin (LIPITOR) 10 MG tablet, Take 10 mg by mouth daily, Disp: , Rfl:     lansoprazole (PREVACID) 15 MG capsule, Take 15 mg by mouth daily, Disp: , Rfl:     Glucosamine-Chondroit-Vit C-Mn (GLUCOSAMINE 1500 COMPLEX PO), Take 1 tablet by mouth daily, Disp: , Rfl:     senna (SENOKOT) 8.6 MG tablet, Take 1 tablet by mouth daily, Disp: , Rfl:     fluocinonide (LIDEX) 0.05 % cream, Apply 1 oz topically nightly as needed Apply topically 2 times daily. , Disp: , Rfl:     Allergies:    Nylon; Other; and Morphine    OBJECTIVE:     Vitals:    /83   Pulse 67   Ht 5' 6\" (1.676 m)   Wt 182 lb (82.6 kg)   SpO2 96%   BMI 29.38 kg/m²  Body mass index is 29.38 kg/m². Physical Exam:    GENERAL APPEARANCE: awake, alert, non-obese 62y.o. year old male, well-oriented, and in no acute distress  LUNGS: clear, equal breath sounds bilaterally without rales, rhonchi, or wheezes. CARDIO: S1 and S2 are within normal limits, regular rate and rhythm, no murmurs, no jugular venous distention and no ankle edema. ABDOMEN: The abdomen was examined upright. He has an excellent appearance to his surgical wound with very minimal ecchymosis. He has the usual healing ridge.   There is no testicular swelling or tenderness and the repair is intact. ASSESSMENT:     Assessment:  1. Excellent status both from his him for level and the appearance of the wound. 2.  The repair is intact. 3.  There are no signs of any wound complications. PLAN:     1. I did give him permission to do lower body exercises such as a stationary bike or elliptical machine. Otherwise he is to continue his 10 pound lifting restriction for 3 more days. After that he may start to have activity according to his comfort level. 2.  Teen hygiene measures will suffice for wound care. Follow-up: 3 weeks    Electronically signed by Andrey Ashley MD    This note was created with the assistance of a speech-recognition program.  Although the intention is to generate a document that actually reflects the content of the visit, no guarantees can be provided that every mistake has been identified and corrected by editing.

## 2020-09-29 ENCOUNTER — HOSPITAL ENCOUNTER (OUTPATIENT)
Dept: SURGERY | Age: 65
Discharge: HOME | End: 2020-09-29
Payer: COMMERCIAL

## 2020-09-29 VITALS
HEART RATE: 64 BPM | HEIGHT: 66 IN | BODY MASS INDEX: 28.93 KG/M2 | DIASTOLIC BLOOD PRESSURE: 84 MMHG | SYSTOLIC BLOOD PRESSURE: 142 MMHG | OXYGEN SATURATION: 96 % | WEIGHT: 180 LBS

## 2020-09-29 PROCEDURE — 99024 POSTOP FOLLOW-UP VISIT: CPT | Performed by: SURGERY

## 2020-09-29 PROCEDURE — 99211 OFF/OP EST MAY X REQ PHY/QHP: CPT

## 2020-09-29 NOTE — PROGRESS NOTES
[address]  Post-op Hernia Note    PATIENT NAME: Olvia Ridley   MRN NUMBER: [id number]  YOB: 1955  PHONE NUMBER: [phone]  PRIMARY CARE PHYSICIAN: Lucio Crane MD  TODAY'S DATE: 9/29/2020    SUBJECTIVE:     Chief Complaint: Four week post op visit s/p Left Inguinal Hernia repair. History of Present Illness: : Jon Cano is 4 weeks postoperative following repair of a left inguinal hernia (L2M1F0) as an outpatient under local anesthesia with IV sedation. He has been increasing his activities gradually according to his comfort level. He tells me he is back to mowing his lawn and going to the gym without any difficulty or discomfort. The only modulation of his activities that he is still doing is using somewhat decreased weights on the lifting machines when he goes to the gym. He has had no wound problems and he is very happy with his current comfort level.     Date of Surgery:  9/1/2020    Past Medical History:    Past Medical History:   Diagnosis Date    Arthritis     Chronic kidney disease     DDD (degenerative disc disease), cervical     lumbar    Difficulty urinating     Fracture lumbar vertebra-closed ([address]     chronic fracture to T11 T12    GERD (gastroesophageal reflux disease)     Hyperlipidemia     Kidney stone     Palpitations     Ureteral stone with hydronephrosis 10/27/2014       Past Surgical History:    Past Surgical History:   Procedure Laterality Date    APPENDECTOMY      CARPAL TUNNEL RELEASE Right     7/29/16    COLONOSCOPY      CRANIOTOMY      CYSTOSCOPY  10/28/14    ureteroscopy, left ureteral stent placement    CYSTOSCOPY Right 5/1/2019    CYSTOSCOPY, INSERTION OCCLUSIVE BALLOON, PT GOING TO INTERVENTIONAL RAD performed by Amparo Guardado MD at [address], COLON, DIAGNOSTIC      HERNIA REPAIR      ingenal, umbilical    HERNIA REPAIR  80/34/4602    umbilical    HERNIA REPAIR Left 9/1/2020    LEFT HERNIA INGUINAL REPAIR WITH MESH performed by Cory Esquivel MD at [facility] NEPHROLITHOTOMY Right 5/1/2019    NEPHROLITHOTOMY PERCUTANEOUS, LITHOCLAST, C-ARM performed by Arcadio Burns MD at [address] [place] [address] NEPHROSTOMY Right 05/01/2019     NEPHROLITHOTOMY PERCUTANEOUS, LITHOCLAST    OTHER SURGICAL HISTORY Right 05/01/2019    wire to bladder       Medications:     Current Outpatient Medications:     tamsulosin (FLOMAX) 0.4 MG capsule, Take 1 capsule by mouth daily Take while stent in place. , Disp: 30 capsule, Rfl: 0    tamsulosin (FLOMAX) 0.4 MG capsule, Take 0.4 mg by mouth daily, Disp: , Rfl:     ferrous sulfate 325 (65 Fe) MG tablet, Take 325 mg by mouth daily (with breakfast), Disp: , Rfl:     Biotin 1000 MCG TABS, Take 5,000 mcg by mouth daily, Disp: , Rfl:     atorvastatin (LIPITOR) 10 MG tablet, Take 10 mg by mouth daily, Disp: , Rfl:     lansoprazole (PREVACID) 15 MG capsule, Take 15 mg by mouth daily, Disp: , Rfl:     Glucosamine-Chondroit-Vit C-Mn (GLUCOSAMINE 1500 COMPLEX PO), Take 1 tablet by mouth daily, Disp: , Rfl:     senna (SENOKOT) 8.6 MG tablet, Take 1 tablet by mouth daily, Disp: , Rfl:     fluocinonide (LIDEX) 0.05 % cream, Apply 1 oz topically nightly as needed Apply topically 2 times daily. , Disp: , Rfl:     Allergies:    Nylon; Other; and Morphine    OBJECTIVE:     Vitals:    BP (!) 142/84   Pulse 64   Ht 5' 6\" (1.676 m)   Wt 180 lb (81.6 kg)   SpO2 96%   BMI 29.05 kg/m²  Body mass index is 29.05 kg/m². Physical Exam:    GENERAL APPEARANCE: awake, alert, fit appearing nonobese 60y.o. year old male, well-oriented, and in no acute distress  ABDOMEN: The abdomen and inguinal area were examined upright. His wound is healing very nicely without any sign of wound complications. There is no evidence of hematoma or seroma within the wound. The repair is intact upright exam.  He still has a healing ridge but it is softening. ASSESSMENT:     Assessment:  1.  Excellent status now 1 month after repair of left inguinal hernia. His comfort and functional levels are back to baseline. 2.  There are no signs of any wound complications. 3.  The repair is intact to clinical exam.    PLAN:     1. He is to continue his activity according to comfort level. 2.  Routine hygiene measures will suffice for wound care. 3.  He may follow-up with me now on a as needed basis but he will have long-term outcomes follow-up through the [facility]. Follow-up: PRN and through the [facility]. Electronically signed by Gregorio Sadler MD    This note was created with the assistance of a speech-recognition program.  Although the intention is to generate a document that actually reflects the content of the visit, no guarantees can be provided that every mistake has been identified and corrected by editing.

## 2021-06-10 RX ORDER — OXYCODONE HYDROCHLORIDE AND ACETAMINOPHEN 5; 325 MG/1; MG/1
1 TABLET ORAL EVERY 6 HOURS PRN
Status: ON HOLD | COMMUNITY
Start: 2021-06-09 | End: 2021-06-11 | Stop reason: HOSPADM

## 2021-06-10 RX ORDER — TRAMADOL HYDROCHLORIDE 50 MG/1
100 TABLET ORAL 3 TIMES DAILY
COMMUNITY

## 2021-06-10 RX ORDER — ONDANSETRON 4 MG/1
4 TABLET, ORALLY DISINTEGRATING ORAL EVERY 8 HOURS PRN
COMMUNITY
Start: 2021-06-09 | End: 2022-06-08

## 2021-06-10 RX ORDER — ACETAMINOPHEN 500 MG
1000 TABLET ORAL 3 TIMES DAILY
COMMUNITY

## 2021-06-10 RX ORDER — IBUPROFEN 200 MG
200 TABLET ORAL 2 TIMES DAILY
COMMUNITY
End: 2022-06-08

## 2021-06-11 ENCOUNTER — ANESTHESIA EVENT (OUTPATIENT)
Dept: OPERATING ROOM | Age: 66
End: 2021-06-11
Payer: COMMERCIAL

## 2021-06-11 ENCOUNTER — ANESTHESIA (OUTPATIENT)
Dept: OPERATING ROOM | Age: 66
End: 2021-06-11
Payer: COMMERCIAL

## 2021-06-11 ENCOUNTER — APPOINTMENT (OUTPATIENT)
Dept: GENERAL RADIOLOGY | Age: 66
End: 2021-06-11
Attending: UROLOGY
Payer: COMMERCIAL

## 2021-06-11 ENCOUNTER — HOSPITAL ENCOUNTER (OUTPATIENT)
Age: 66
Setting detail: OUTPATIENT SURGERY
Discharge: HOME | End: 2021-06-11
Attending: UROLOGY | Admitting: UROLOGY
Payer: COMMERCIAL

## 2021-06-11 VITALS — DIASTOLIC BLOOD PRESSURE: 67 MMHG | SYSTOLIC BLOOD PRESSURE: 105 MMHG | TEMPERATURE: 98.2 F | OXYGEN SATURATION: 99 %

## 2021-06-11 VITALS
WEIGHT: 170 LBS | HEART RATE: 66 BPM | OXYGEN SATURATION: 94 % | HEIGHT: 66 IN | BODY MASS INDEX: 27.32 KG/M2 | TEMPERATURE: 97.9 F | DIASTOLIC BLOOD PRESSURE: 78 MMHG | SYSTOLIC BLOOD PRESSURE: 139 MMHG | RESPIRATION RATE: 18 BRPM

## 2021-06-11 DIAGNOSIS — N20.0 NEPHROLITHIASIS: Primary | ICD-10-CM

## 2021-06-11 LAB
SARS-COV-2, RAPID: NOT DETECTED
SPECIMEN DESCRIPTION: NORMAL

## 2021-06-11 PROCEDURE — 2500000003 HC RX 250 WO HCPCS: Performed by: NURSE ANESTHETIST, CERTIFIED REGISTERED

## 2021-06-11 PROCEDURE — 3600000014 HC SURGERY LEVEL 4 ADDTL 15MIN: Performed by: UROLOGY

## 2021-06-11 PROCEDURE — 7100000000 HC PACU RECOVERY - FIRST 15 MIN: Performed by: UROLOGY

## 2021-06-11 PROCEDURE — 6360000002 HC RX W HCPCS: Performed by: NURSE ANESTHETIST, CERTIFIED REGISTERED

## 2021-06-11 PROCEDURE — 3209999900 FLUORO FOR SURGICAL PROCEDURES

## 2021-06-11 PROCEDURE — 6360000002 HC RX W HCPCS

## 2021-06-11 PROCEDURE — 2709999900 HC NON-CHARGEABLE SUPPLY: Performed by: UROLOGY

## 2021-06-11 PROCEDURE — 6360000002 HC RX W HCPCS: Performed by: ANESTHESIOLOGY

## 2021-06-11 PROCEDURE — C1758 CATHETER, URETERAL: HCPCS | Performed by: UROLOGY

## 2021-06-11 PROCEDURE — 7100000011 HC PHASE II RECOVERY - ADDTL 15 MIN: Performed by: UROLOGY

## 2021-06-11 PROCEDURE — 3700000001 HC ADD 15 MINUTES (ANESTHESIA): Performed by: UROLOGY

## 2021-06-11 PROCEDURE — 2580000003 HC RX 258: Performed by: UROLOGY

## 2021-06-11 PROCEDURE — 93005 ELECTROCARDIOGRAM TRACING: CPT | Performed by: ANESTHESIOLOGY

## 2021-06-11 PROCEDURE — 3700000000 HC ANESTHESIA ATTENDED CARE: Performed by: UROLOGY

## 2021-06-11 PROCEDURE — 87635 SARS-COV-2 COVID-19 AMP PRB: CPT

## 2021-06-11 PROCEDURE — C1769 GUIDE WIRE: HCPCS | Performed by: UROLOGY

## 2021-06-11 PROCEDURE — 3600000004 HC SURGERY LEVEL 4 BASE: Performed by: UROLOGY

## 2021-06-11 PROCEDURE — 2580000003 HC RX 258: Performed by: ANESTHESIOLOGY

## 2021-06-11 PROCEDURE — 2720000010 HC SURG SUPPLY STERILE: Performed by: UROLOGY

## 2021-06-11 PROCEDURE — C2617 STENT, NON-COR, TEM W/O DEL: HCPCS | Performed by: UROLOGY

## 2021-06-11 PROCEDURE — 7100000001 HC PACU RECOVERY - ADDTL 15 MIN: Performed by: UROLOGY

## 2021-06-11 PROCEDURE — 93005 ELECTROCARDIOGRAM TRACING: CPT | Performed by: UROLOGY

## 2021-06-11 PROCEDURE — 7100000010 HC PHASE II RECOVERY - FIRST 15 MIN: Performed by: UROLOGY

## 2021-06-11 DEVICE — URETERAL STENT
Type: IMPLANTABLE DEVICE | Site: URETER | Status: FUNCTIONAL
Brand: POLARIS™ ULTRA

## 2021-06-11 RX ORDER — OXYCODONE HYDROCHLORIDE AND ACETAMINOPHEN 5; 325 MG/1; MG/1
1 TABLET ORAL EVERY 8 HOURS PRN
Qty: 15 TABLET | Refills: 0 | Status: SHIPPED | OUTPATIENT
Start: 2021-06-11 | End: 2021-06-16

## 2021-06-11 RX ORDER — MIDAZOLAM HYDROCHLORIDE 2 MG/2ML
1 INJECTION, SOLUTION INTRAMUSCULAR; INTRAVENOUS EVERY 10 MIN PRN
Status: DISCONTINUED | OUTPATIENT
Start: 2021-06-11 | End: 2021-06-11 | Stop reason: HOSPADM

## 2021-06-11 RX ORDER — ONDANSETRON 2 MG/ML
4 INJECTION INTRAMUSCULAR; INTRAVENOUS
Status: DISCONTINUED | OUTPATIENT
Start: 2021-06-11 | End: 2021-06-11 | Stop reason: HOSPADM

## 2021-06-11 RX ORDER — FENTANYL CITRATE 50 UG/ML
INJECTION, SOLUTION INTRAMUSCULAR; INTRAVENOUS PRN
Status: DISCONTINUED | OUTPATIENT
Start: 2021-06-11 | End: 2021-06-11 | Stop reason: SDUPTHER

## 2021-06-11 RX ORDER — FENTANYL CITRATE 50 UG/ML
INJECTION, SOLUTION INTRAMUSCULAR; INTRAVENOUS
Status: COMPLETED
Start: 2021-06-11 | End: 2021-06-11

## 2021-06-11 RX ORDER — PROPOFOL 10 MG/ML
INJECTION, EMULSION INTRAVENOUS PRN
Status: DISCONTINUED | OUTPATIENT
Start: 2021-06-11 | End: 2021-06-11 | Stop reason: SDUPTHER

## 2021-06-11 RX ORDER — PHENYLEPHRINE HCL IN 0.9% NACL 0.5 MG/5ML
SYRINGE (ML) INTRAVENOUS PRN
Status: DISCONTINUED | OUTPATIENT
Start: 2021-06-11 | End: 2021-06-11 | Stop reason: SDUPTHER

## 2021-06-11 RX ORDER — MORPHINE SULFATE 2 MG/ML
2 INJECTION, SOLUTION INTRAMUSCULAR; INTRAVENOUS EVERY 5 MIN PRN
Status: DISCONTINUED | OUTPATIENT
Start: 2021-06-11 | End: 2021-06-11 | Stop reason: HOSPADM

## 2021-06-11 RX ORDER — OXYCODONE HYDROCHLORIDE AND ACETAMINOPHEN 5; 325 MG/1; MG/1
2 TABLET ORAL PRN
Status: DISCONTINUED | OUTPATIENT
Start: 2021-06-11 | End: 2021-06-11 | Stop reason: HOSPADM

## 2021-06-11 RX ORDER — MAGNESIUM HYDROXIDE 1200 MG/15ML
LIQUID ORAL CONTINUOUS PRN
Status: COMPLETED | OUTPATIENT
Start: 2021-06-11 | End: 2021-06-11

## 2021-06-11 RX ORDER — SODIUM CHLORIDE, SODIUM LACTATE, POTASSIUM CHLORIDE, CALCIUM CHLORIDE 600; 310; 30; 20 MG/100ML; MG/100ML; MG/100ML; MG/100ML
INJECTION, SOLUTION INTRAVENOUS CONTINUOUS
Status: DISCONTINUED | OUTPATIENT
Start: 2021-06-11 | End: 2021-06-11 | Stop reason: HOSPADM

## 2021-06-11 RX ORDER — DIPHENHYDRAMINE HYDROCHLORIDE 50 MG/ML
12.5 INJECTION INTRAMUSCULAR; INTRAVENOUS
Status: DISCONTINUED | OUTPATIENT
Start: 2021-06-11 | End: 2021-06-11 | Stop reason: HOSPADM

## 2021-06-11 RX ORDER — LIDOCAINE HYDROCHLORIDE 10 MG/ML
INJECTION, SOLUTION EPIDURAL; INFILTRATION; INTRACAUDAL; PERINEURAL PRN
Status: DISCONTINUED | OUTPATIENT
Start: 2021-06-11 | End: 2021-06-11 | Stop reason: SDUPTHER

## 2021-06-11 RX ORDER — CEPHALEXIN 500 MG/1
500 CAPSULE ORAL 3 TIMES DAILY
Qty: 9 CAPSULE | Refills: 0 | Status: SHIPPED | OUTPATIENT
Start: 2021-06-11 | End: 2021-06-14

## 2021-06-11 RX ORDER — FENTANYL CITRATE 50 UG/ML
50 INJECTION, SOLUTION INTRAMUSCULAR; INTRAVENOUS ONCE
Status: COMPLETED | OUTPATIENT
Start: 2021-06-11 | End: 2021-06-11

## 2021-06-11 RX ORDER — FENTANYL CITRATE 50 UG/ML
25 INJECTION, SOLUTION INTRAMUSCULAR; INTRAVENOUS EVERY 5 MIN PRN
Status: DISCONTINUED | OUTPATIENT
Start: 2021-06-11 | End: 2021-06-11 | Stop reason: HOSPADM

## 2021-06-11 RX ORDER — DEXAMETHASONE SODIUM PHOSPHATE 10 MG/ML
INJECTION INTRAMUSCULAR; INTRAVENOUS PRN
Status: DISCONTINUED | OUTPATIENT
Start: 2021-06-11 | End: 2021-06-11 | Stop reason: SDUPTHER

## 2021-06-11 RX ORDER — OXYBUTYNIN CHLORIDE 5 MG/1
5 TABLET, EXTENDED RELEASE ORAL DAILY PRN
Qty: 3 TABLET | Refills: 0 | Status: SHIPPED | OUTPATIENT
Start: 2021-06-11 | End: 2022-06-08

## 2021-06-11 RX ORDER — LABETALOL HYDROCHLORIDE 5 MG/ML
5 INJECTION, SOLUTION INTRAVENOUS EVERY 10 MIN PRN
Status: DISCONTINUED | OUTPATIENT
Start: 2021-06-11 | End: 2021-06-11 | Stop reason: HOSPADM

## 2021-06-11 RX ORDER — ONDANSETRON 2 MG/ML
INJECTION INTRAMUSCULAR; INTRAVENOUS PRN
Status: DISCONTINUED | OUTPATIENT
Start: 2021-06-11 | End: 2021-06-11 | Stop reason: SDUPTHER

## 2021-06-11 RX ORDER — OXYCODONE HYDROCHLORIDE AND ACETAMINOPHEN 5; 325 MG/1; MG/1
1 TABLET ORAL PRN
Status: DISCONTINUED | OUTPATIENT
Start: 2021-06-11 | End: 2021-06-11 | Stop reason: HOSPADM

## 2021-06-11 RX ORDER — CEFAZOLIN SODIUM 1 G/3ML
INJECTION, POWDER, FOR SOLUTION INTRAMUSCULAR; INTRAVENOUS PRN
Status: DISCONTINUED | OUTPATIENT
Start: 2021-06-11 | End: 2021-06-11 | Stop reason: SDUPTHER

## 2021-06-11 RX ORDER — GLYCOPYRROLATE 1 MG/5 ML
SYRINGE (ML) INTRAVENOUS PRN
Status: DISCONTINUED | OUTPATIENT
Start: 2021-06-11 | End: 2021-06-11 | Stop reason: SDUPTHER

## 2021-06-11 RX ADMIN — Medication 100 MCG: at 16:52

## 2021-06-11 RX ADMIN — FENTANYL CITRATE 50 MCG: 50 INJECTION, SOLUTION INTRAMUSCULAR; INTRAVENOUS at 16:37

## 2021-06-11 RX ADMIN — ONDANSETRON 4 MG: 2 INJECTION INTRAMUSCULAR; INTRAVENOUS at 17:09

## 2021-06-11 RX ADMIN — Medication 0.2 MG: at 16:56

## 2021-06-11 RX ADMIN — Medication 100 MCG: at 16:57

## 2021-06-11 RX ADMIN — SODIUM CHLORIDE, POTASSIUM CHLORIDE, SODIUM LACTATE AND CALCIUM CHLORIDE: 600; 310; 30; 20 INJECTION, SOLUTION INTRAVENOUS at 16:23

## 2021-06-11 RX ADMIN — FENTANYL CITRATE 50 MCG: 50 INJECTION, SOLUTION INTRAMUSCULAR; INTRAVENOUS at 13:43

## 2021-06-11 RX ADMIN — FENTANYL CITRATE 50 MCG: 50 INJECTION, SOLUTION INTRAMUSCULAR; INTRAVENOUS at 16:38

## 2021-06-11 RX ADMIN — PROPOFOL 50 MG: 10 INJECTION, EMULSION INTRAVENOUS at 16:38

## 2021-06-11 RX ADMIN — PROPOFOL 150 MG: 10 INJECTION, EMULSION INTRAVENOUS at 16:37

## 2021-06-11 RX ADMIN — SODIUM CHLORIDE, POTASSIUM CHLORIDE, SODIUM LACTATE AND CALCIUM CHLORIDE: 600; 310; 30; 20 INJECTION, SOLUTION INTRAVENOUS at 17:08

## 2021-06-11 RX ADMIN — LIDOCAINE HYDROCHLORIDE 50 MG: 10 INJECTION, SOLUTION EPIDURAL; INFILTRATION; INTRACAUDAL; PERINEURAL at 16:37

## 2021-06-11 RX ADMIN — Medication 100 MCG: at 16:49

## 2021-06-11 RX ADMIN — FENTANYL CITRATE 25 MCG: 50 INJECTION, SOLUTION INTRAMUSCULAR; INTRAVENOUS at 16:02

## 2021-06-11 RX ADMIN — DEXAMETHASONE SODIUM PHOSPHATE 10 MG: 10 INJECTION INTRAMUSCULAR; INTRAVENOUS at 16:44

## 2021-06-11 RX ADMIN — CEFAZOLIN 2000 MG: 1 INJECTION, POWDER, FOR SOLUTION INTRAMUSCULAR; INTRAVENOUS at 16:38

## 2021-06-11 ASSESSMENT — PULMONARY FUNCTION TESTS
PIF_VALUE: 4
PIF_VALUE: 1
PIF_VALUE: 13
PIF_VALUE: 11
PIF_VALUE: 13
PIF_VALUE: 13
PIF_VALUE: 8
PIF_VALUE: 13
PIF_VALUE: 3
PIF_VALUE: 11
PIF_VALUE: 14
PIF_VALUE: 13
PIF_VALUE: 13
PIF_VALUE: 11
PIF_VALUE: 13
PIF_VALUE: 1
PIF_VALUE: 1
PIF_VALUE: 13
PIF_VALUE: 13
PIF_VALUE: 4
PIF_VALUE: 13
PIF_VALUE: 6
PIF_VALUE: 1
PIF_VALUE: 10
PIF_VALUE: 1
PIF_VALUE: 2
PIF_VALUE: 3
PIF_VALUE: 8
PIF_VALUE: 13
PIF_VALUE: 3
PIF_VALUE: 4
PIF_VALUE: 11
PIF_VALUE: 13
PIF_VALUE: 5
PIF_VALUE: 16
PIF_VALUE: 9
PIF_VALUE: 10
PIF_VALUE: 13
PIF_VALUE: 1
PIF_VALUE: 13
PIF_VALUE: 10
PIF_VALUE: 2
PIF_VALUE: 1

## 2021-06-11 ASSESSMENT — PAIN SCALES - GENERAL
PAINLEVEL_OUTOF10: 0
PAINLEVEL_OUTOF10: 5
PAINLEVEL_OUTOF10: 0
PAINLEVEL_OUTOF10: 7
PAINLEVEL_OUTOF10: 0

## 2021-06-11 ASSESSMENT — PAIN - FUNCTIONAL ASSESSMENT: PAIN_FUNCTIONAL_ASSESSMENT: 0-10

## 2021-06-11 ASSESSMENT — PAIN DESCRIPTION - DESCRIPTORS: DESCRIPTORS: ACHING;THROBBING

## 2021-06-11 NOTE — ANESTHESIA PRE PROCEDURE
Department of Anesthesiology  Preprocedure Note       Name:  Ubaldo Montana   Age:  60 y.o.  :  1955                                          MRN:  [id number]         Date:  2021      Surgeon: Corinthia Goodpasture):  Ramana Montemayor MD    Procedure:  HOLMIUM LASER, NEPHROLITHOTOMY PERCUTANEOUS, LITHOCLAST, C-ARM  (PT COMING FROM INTERVENTIONAL RAD) **SHORT STAY** (Right )   Anesthesia type: General   Pre-op diagnosis: RIGHT KIDNEY STONE         Medications prior to admission:   Prior to Admission medications    Medication Sig Start Date End Date Taking? Authorizing Provider   ibuprofen (ADVIL;MOTRIN) 200 MG tablet Take 200 mg by mouth 2 times daily     Historical Provider, MD   ondansetron (ZOFRAN-ODT) 4 MG disintegrating tablet Take 4 mg by mouth every 8 hours as needed 21   Historical Provider, MD   oxyCODONE-acetaminophen (PERCOCET) 5-325 MG per tablet Take 1 tablet by mouth every 6 hours as needed. 21  Historical Provider, MD   traMADol (ULTRAM) 50 MG tablet Take 100 mg by mouth 3 times daily.      Historical Provider, MD   acetaminophen (TYLENOL) 500 MG tablet Take 1,000 mg by mouth three times daily    Historical Provider, MD   tamsulosin (FLOMAX) 0.4 MG capsule Take 0.4 mg by mouth daily    Historical Provider, MD   ferrous sulfate 325 (65 Fe) MG tablet Take 325 mg by mouth daily (with breakfast)    Historical Provider, MD   Biotin 1000 MCG TABS Take 5,000 mcg by mouth daily    Historical Provider, MD   atorvastatin (LIPITOR) 10 MG tablet Take 10 mg by mouth daily    Historical Provider, MD   lansoprazole (PREVACID) 15 MG capsule Take 15 mg by mouth daily    Historical Provider, MD   Glucosamine-Chondroit-Vit C-Mn (GLUCOSAMINE 1500 COMPLEX PO) Take 1 tablet by mouth daily    Historical Provider, MD   senna (SENOKOT) 8.6 MG tablet Take 1 tablet by mouth daily Patient takes 6 tablets daily    Historical Provider, MD   fluocinonide (LIDEX) 0.05 % cream Apply 1 oz topically nightly as needed Apply topically 2 times daily. Historical Provider, MD       Current medications:    No current facility-administered medications for this visit. No current outpatient medications on file. Facility-Administered Medications Ordered in Other Visits   Medication Dose Route Frequency Provider Last Rate Last Admin    ceFAZolin (ANCEF) 2000 mg in dextrose 5 % 50 mL IVPB  2,000 mg Intravenous On Call to [address] [facility], PA-C           Allergies:     Allergies   Allergen Reactions    Nylon      surtures    Other      Nylon sutures-wound does not heal when used    Morphine Nausea And Vomiting       Problem List:    Patient Active Problem List   Diagnosis Code    Flank pain R10.9    Ureteral stone with hydronephrosis N13.2    Nausea & vomiting R11.2    Obstructive uropathy N13.9    Chronic midline low back pain M54.5, G89.29    Nephrolithiasis N20.0    Neurogenic bladder N31.9       Past Medical History:        Diagnosis Date    Arthritis     Chronic kidney disease     DDD (degenerative disc disease), cervical     lumbar    Difficulty urinating     Fracture lumbar vertebra-closed ([facility])     chronic fracture to T11 T12    GERD (gastroesophageal reflux disease)     History of fracture     Sacrum, pelvis, ribs    History of stress test     x2    Hyperlipidemia     Kidney stone     Left    Neurogenic bladder     Palpitations     Thyroid disease     Hyperparathyroidism    Under care of team 06/10/2021    Pcp: Dr. Nicolás Ashley, last visit 2021    Under care of team 06/10/2021    Urology: Kristin Adame, last visit 2/2021    Under care of team 06/10/2021    Orthopedic: Dr. Camryn Owen, last visit 4/2021    Under care of team 06/10/2021    Ortho: Lucretia Israel, last visit 2020    Ureteral stone with hydronephrosis 10/27/2014    Wears glasses        Past Surgical History:        Procedure Laterality Date    APPENDECTOMY      CARPAL TUNNEL RELEASE Right     7/29/16    COLONOSCOPY      CRANIOTOMY      CYSTOSCOPY  10/28/14    ureteroscopy, left ureteral stent placement    CYSTOSCOPY Right 5/1/2019    CYSTOSCOPY, INSERTION OCCLUSIVE BALLOON, PT GOING TO INTERVENTIONAL RAD performed by Haydee Giron MD at [facility], COLON, DIAGNOSTIC      HERNIA REPAIR      ingenal, umbilical    HERNIA REPAIR  18/23/7144    umbilical    HERNIA REPAIR Left 9/1/2020    LEFT HERNIA INGUINAL REPAIR WITH MESH performed by Carmela Mason MD at [address] LITHOTRIPSY      NEPHROLITHOTOMY Right 5/1/2019    NEPHROLITHOTOMY PERCUTANEOUS, LITHOCLAST, C-ARM performed by Haydee Giron MD at [address] NEPHROSTOMY Right 05/01/2019     NEPHROLITHOTOMY PERCUTANEOUS, LITHOCLAST    OTHER SURGICAL HISTORY Right 05/01/2019    wire to bladder    SKIN BIOPSY      back    TONSILLECTOMY      WISDOM TOOTH EXTRACTION         Social History:    Social History     Tobacco Use    Smoking status: Never Smoker    Smokeless tobacco: Never Used   Substance Use Topics    Alcohol use: Not Currently                                Counseling given: Not Answered      Vital Signs (Current): There were no vitals filed for this visit.                                            BP Readings from Last 3 Encounters:   09/29/20 (!) 142/84   09/08/20 136/83   09/01/20 123/81       NPO Status:                                                                                 BMI:   Wt Readings from Last 3 Encounters:   06/11/21 170 lb (77.1 kg)   09/29/20 180 lb (81.6 kg)   09/08/20 182 lb (82.6 kg)     There is no height or weight on file to calculate BMI.    CBC:   Lab Results   Component Value Date    WBC 6.3 05/06/2019    RBC 4.87 05/06/2019    HGB 15.6 05/06/2019    HCT 46.4 05/06/2019    MCV 95.3 05/06/2019    RDW 11.6 05/06/2019     05/06/2019       CMP:   Lab Results   Component Value Date     05/06/2019    K 4.4 05/06/2019     05/06/2019    CO2 24 05/06/2019    BUN 12 05/06/2019 CREATININE 0.56 05/06/2019    GFRAA >60 05/06/2019    LABGLOM >60 05/06/2019    GLUCOSE 119 05/06/2019    CALCIUM 11.2 05/06/2019       POC Tests: No results for input(s): POCGLU, POCNA, POCK, POCCL, POCBUN, POCHEMO, POCHCT in the last 72 hours. Coags:   Lab Results   Component Value Date    PROTIME 10.0 05/06/2019    INR 1.0 05/06/2019    APTT 26.2 05/06/2019       HCG (If Applicable): No results found for: PREGTESTUR, PREGSERUM, HCG, HCGQUANT     ABGs: No results found for: PHART, PO2ART, ART2UAW, LPH3MFS, BEART, I0ONCYLG     Type & Screen (If Applicable):  No results found for: LABABO, [address]    Anesthesia Evaluation  Patient summary reviewed and Nursing notes reviewed no history of anesthetic complications:   Airway: Mallampati: II  TM distance: >3 FB   Neck ROM: full  Mouth opening: > = 3 FB Dental: normal exam         Pulmonary:Negative Pulmonary ROS and normal exam                               Cardiovascular:Negative CV ROS  Exercise tolerance: good (>4 METS),   (+) MCARTHUR:,       ECG reviewed  Rhythm: regular  Rate: normal                    Neuro/Psych:   Negative Neuro/Psych ROS              GI/Hepatic/Renal:   (+) GERD: well controlled, renal disease: kidney stones,          ROS comment: Right renal calculi. Endo/Other:    (+) : arthritis: OA., .                 Abdominal:           Vascular: negative vascular ROS. Anesthesia Plan      general     ASA 2     (Asa 2)  Induction: intravenous. MIPS: Postoperative opioids intended. Anesthetic plan and risks discussed with patient. Plan discussed with CRNA.     Attending anesthesiologist reviewed and agrees with Johanna Masterson MD   6/11/2021

## 2021-06-11 NOTE — H&P
Pre-op History and Physical  Luis Miranda PA-C    Patient:  Walter Flanagan  MRN: [id number]  YOB: 1955    HISTORY OF PRESENT ILLNESS:     The patient is a 60 y.o. male who presents with left proximal ureteral stone about 1cm with some hydro, and smaller stones within left kidney diagnosed on recent [facility]. Here for Cystoscopy, ureteroscopy, holmium laser lithotripsy, stent placement (LEFT). Patient's old records, notes and chart reviewed and summarized above. Luis Miranda PA-C independently reviewed the images and verified the radiology reports from:    No results found.       Past Medical History:    Past Medical History:   Diagnosis Date    Arthritis     Chronic kidney disease     DDD (degenerative disc disease), cervical     lumbar    Difficulty urinating     Fracture lumbar vertebra-closed (HCC)     chronic fracture to T11 T12    GERD (gastroesophageal reflux disease)     History of fracture     Sacrum, pelvis, ribs    History of stress test     x2    Hyperlipidemia     Kidney stone     Left    Neurogenic bladder     Palpitations     Thyroid disease     Hyperparathyroidism    Under care of team 06/10/2021    Pcp: Dr. Meseret Silva, last visit 2021    Under care of team 06/10/2021    Urology: Geri Servin, last visit 2/2021    Under care of team 06/10/2021    Orthopedic: Dr. Narendra Roland, last visit 4/2021    Under care of team 06/10/2021    Ortho: Chacha Rod, last visit 2020    Ureteral stone with hydronephrosis 10/27/2014    Wears glasses        Past Surgical History:    Past Surgical History:   Procedure Laterality Date    APPENDECTOMY      CARPAL TUNNEL RELEASE Right     7/29/16    COLONOSCOPY      CRANIOTOMY      CYSTOSCOPY  10/28/14    ureteroscopy, left ureteral stent placement    CYSTOSCOPY Right 5/1/2019    CYSTOSCOPY, INSERTION OCCLUSIVE BALLOON, PT GOING TO INTERVENTIONAL RAD performed by Kandy Mooney MD at [address] [place] [address] ENDOSCOPY, COLON, DIAGNOSTIC      HERNIA REPAIR      ingenal, umbilical    HERNIA REPAIR  70/63/8249    umbilical    HERNIA REPAIR Left 9/1/2020    LEFT HERNIA INGUINAL REPAIR WITH MESH performed by Angelita Brunner, MD at [address] LITHOTRIPSY      NEPHROLITHOTOMY Right 5/1/2019    NEPHROLITHOTOMY PERCUTANEOUS, LITHOCLAST, C-ARM performed by Kandy Mooney MD at [address] NEPHROSTOMY Right 05/01/2019     NEPHROLITHOTOMY PERCUTANEOUS, LITHOCLAST    OTHER SURGICAL HISTORY Right 05/01/2019    wire to bladder    SKIN BIOPSY      back    TONSILLECTOMY      WISDOM TOOTH EXTRACTION         Medications Prior to Admission:    Prior to Admission medications    Medication Sig Start Date End Date Taking? Authorizing Provider   ibuprofen (ADVIL;MOTRIN) 200 MG tablet Take 200 mg by mouth 2 times daily    Yes Historical Provider, MD   ondansetron (ZOFRAN-ODT) 4 MG disintegrating tablet Take 4 mg by mouth every 8 hours as needed 6/9/21  Yes Historical Provider, MD   oxyCODONE-acetaminophen (PERCOCET) 5-325 MG per tablet Take 1 tablet by mouth every 6 hours as needed. 6/9/21 6/12/21 Yes Historical Provider, MD   traMADol (ULTRAM) 50 MG tablet Take 100 mg by mouth 3 times daily.     Yes Historical Provider, MD   acetaminophen (TYLENOL) 500 MG tablet Take 1,000 mg by mouth three times daily   Yes Historical Provider, MD   tamsulosin (FLOMAX) 0.4 MG capsule Take 0.4 mg by mouth daily   Yes Historical Provider, MD   ferrous sulfate 325 (65 Fe) MG tablet Take 325 mg by mouth daily (with breakfast)   Yes Historical Provider, MD   Biotin 1000 MCG TABS Take 5,000 mcg by mouth daily   Yes Historical Provider, MD   atorvastatin (LIPITOR) 10 MG tablet Take 10 mg by mouth daily   Yes Historical Provider, MD   lansoprazole (PREVACID) 15 MG capsule Take 15 mg by mouth daily   Yes Historical Provider, MD   Glucosamine-Chondroit-Vit C-Mn (GLUCOSAMINE 1500 COMPLEX PO) Take 1 tablet by mouth daily   Yes Historical Provider, MD fluocinonide (LIDEX) 0.05 % cream Apply 1 oz topically nightly as needed Apply topically 2 times daily. Yes Historical Provider, MD   senna (SENOKOT) 8.6 MG tablet Take 1 tablet by mouth daily Patient takes 6 tablets daily    Historical Provider, MD       Allergies:  Nylon, Other, and Morphine    Social History:    Social History     Socioeconomic History    Marital status:      Spouse name: Not on file    Number of children: Not on file    Years of education: Not on file    Highest education level: Not on file   Occupational History    Not on file   Tobacco Use    Smoking status: Never Smoker    Smokeless tobacco: Never Used   Vaping Use    Vaping Use: Never used   Substance and Sexual Activity    Alcohol use: Yes     Comment: rare    Drug use: No    Sexual activity: Not on file   Other Topics Concern    Not on file   Social History Narrative    Not on file     Social Determinants of Health     Financial Resource Strain:     Difficulty of Paying Living Expenses:    Food Insecurity:     Worried About Running Out of Food in the Last Year:     [address] in the Last Year:    Transportation Needs:     Lack of Transportation (Medical):      Lack of Transportation (Non-Medical):    Physical Activity:     Days of Exercise per Week:     Minutes of Exercise per Session:    Stress:     Feeling of Stress :    Social Connections:     Frequency of Communication with Friends and Family:     Frequency of Social Gatherings with Friends and Family:     Attends [employer] Services:     Active Member of Clubs or Organizations:     Attends Club or Organization Meetings:     Marital Status:    Intimate Partner Violence:     Fear of Current or Ex-Partner:     Emotionally Abused:     Physically Abused:     Sexually Abused:        Family History:    Family History   Problem Relation Age of Onset    Arthritis Mother     Other Mother         dementia    Other Father         dementia    Heart Disease Brother         quadruple bypass 10 years ago       REVIEW OF SYSTEMS:  Constitutional: negative  Eyes: negative  Respiratory: negative  Cardiovascular: negative  Gastrointestinal: negative  Genitourinary: no acute issues  Musculoskeletal: negative  Skin: negative   Neurological: negative  Hematological/Lymphatic: negative  Psychological: negative    PHYSICAL EXAM:    Patient Vitals for the past 24 hrs:   Height Weight   06/10/21 1137 5' 6\" (1.676 m) 170 lb (77.1 kg)     Constitutional: Patient in NAD  Neuro: Alert and oriented to person, place, and time  Psych: Mood and affect normal  Skin: Clean, dry, intact   Lungs: Respiratory effort normal, CTA  Cardiovascular:  Normal peripheral pulses; no murmur. Normal rhythm  Abdomen: Soft, non-tender, non-distended, no hepatosplenomegaly or hernia, CVA tenderness mild left  Bladder: Non-tender and non-disdended   : Non-tender, skin intact, no lesions       LABS:   No results for input(s): WBC, HGB, HCT, MCV, PLT in the last 72 hours. No results for input(s): NA, K, CL, CO2, PHOS, BUN, CREATININE in the last 72 hours. Invalid input(s): CA  No results found for: PSA      Urinalysis: No results for input(s): COLORU, PHUR, LABCAST, WBCUA, RBCUA, MUCUS, TRICHOMONAS, YEAST, BACTERIA, CLARITYU, SPECGRAV, LEUKOCYTESUR, UROBILINOGEN, Shukri Carmita in the last 72 hours. Invalid input(s): NITRATE, GLUCOSEUKETONESUAMORPHOUS     -----------------------------------------------------------------    ASSESSMENT AND PLAN:    Impression:    Left Ureteral stone  Left nephrolithiasis    Patient Active Problem List   Diagnosis    Flank pain    Ureteral stone with hydronephrosis    Nausea & vomiting    Obstructive uropathy    Chronic midline low back pain    Nephrolithiasis    Neurogenic bladder       Plan: Cystoscopy, ureteroscopy, holmium laser lithotripsy, stent placement (LEFT) in OR today.     Consent obtained    The patient was counseled at length about the risks of mikayla Covid-19 during their perioperative period and any recovery window from their procedure. The patient was made aware that mikayla Covid-19  may worsen their prognosis for recovering from their procedure  and lend to a higher morbidity and/or mortality risk. All material risks, benefits, and reasonable alternatives including postponing the procedure were discussed. The patient does wish to proceed with the procedure at this time.         Luis Miranda PA-C  9:36 AM 6/11/2021

## 2021-06-11 NOTE — BRIEF OP NOTE
Brief Postoperative Note      Patient: Abelino Fine  YOB: 1955  MRN: [id number]    Date of Procedure: 6/11/2021    Pre-Op Diagnosis: LEFT URETERAL STONE    Post-Op Diagnosis: Same       Procedure(s):  FORTEC LASER LITHOTRIPSY, CYSTOSCOPY, URETEROSCOPY STENT PLACEMENTDoris Horan CONF# [id number] JOSEFINA    Surgeon(s):  Michael Rdz MD    Assistant:  * No surgical staff found *    Anesthesia: General    Estimated Blood Loss (mL): Minimal    Complications: None    Specimens:   * No specimens in log *    Implants:  Implant Name Type Inv.  Item Serial No.  Lot No. LRB No. Used Action   STENT URET 6FR L26CM PERCFLX HYDR+ DBL PGTL THRD 2  STENT URET 6FR L26CM PERCFLX HYDR+ DBL PGTL THRD 2  [employer] [facility] UROLOGY- [id number] Left 1 Implanted         Drains: * No LDAs found *    Findings:   1. 1cm renal pelvic stone lasered into small submillimeter fragments    Electronically signed by Matthew Vallecillo MD on 6/11/2021 at 5:35 PM

## 2021-06-12 LAB
EKG ATRIAL RATE: 67 BPM
EKG ATRIAL RATE: 75 BPM
EKG P AXIS: 46 DEGREES
EKG P AXIS: 47 DEGREES
EKG P-R INTERVAL: 134 MS
EKG P-R INTERVAL: 140 MS
EKG Q-T INTERVAL: 402 MS
EKG Q-T INTERVAL: 412 MS
EKG QRS DURATION: 86 MS
EKG QRS DURATION: 88 MS
EKG QTC CALCULATION (BAZETT): 435 MS
EKG QTC CALCULATION (BAZETT): 448 MS
EKG R AXIS: 52 DEGREES
EKG R AXIS: 65 DEGREES
EKG T AXIS: 46 DEGREES
EKG T AXIS: 49 DEGREES
EKG VENTRICULAR RATE: 67 BPM
EKG VENTRICULAR RATE: 75 BPM

## 2021-06-12 PROCEDURE — 93010 ELECTROCARDIOGRAM REPORT: CPT | Performed by: INTERNAL MEDICINE

## 2021-06-12 NOTE — ANESTHESIA POSTPROCEDURE EVALUATION
Department of Anesthesiology  Postprocedure Note    Patient: Ubaldo Montana  MRN: [id number]  YOB: 1955  Date of evaluation: 6/12/2021  Time:  6:49 AM     Procedure Summary     Date: 06/11/21 Room / Location: [address]    Anesthesia Start: [facility] Anesthesia Stop: 1151    Procedure: Cinthya Pitch LITHOTRIPSY, [facility]STOSCOPY, [facility] [id number] [address] (Left ) Diagnosis: (LEFT URETERAL STONE)    Surgeons: Ramana Montemayor MD Responsible Provider: Dudley Hansen MD    Anesthesia Type: general ASA Status: 2          Anesthesia Type: general    Irma Phase I: Irma Score: 9    Irma Phase II: Irma Score: 10    Last vitals: Reviewed and per EMR flowsheets.        Anesthesia Post Evaluation    Patient location during evaluation: PACU  Patient participation: complete - patient participated  Level of consciousness: awake and alert  Pain score: 0  Nausea & Vomiting: no nausea  Cardiovascular status: hemodynamically stable  Respiratory status: room air

## 2021-06-12 NOTE — OP NOTE
Operative Note      Patient: Consuelo Tolentino  YOB: 1955  MRN: [id number]    Date of Procedure: 6/11/2021    Pre-Op Diagnosis: LEFT URETERAL STONE    Post-Op Diagnosis: Same       Procedure:  Cystoscopy, Left Ureteroscopy, Left laser lithotripsy, left stent placement    Surgeon(s):  Gonzalez Chairez MD    Assistant:  Isreal Nuñez MD; PGY-2    Anesthesia: General    Estimated Blood Loss (mL): Minimal    Complications: None    Specimens:   * No specimens in log *    Implants:  Implant Name Type Inv. Item Serial No.  Lot No. LRB No. Used Action   STENT URET 6FR L26CM PERCFLX HYDR+ DBL PGTL THRD 2  STENT URET 6FR L26CM PERCFLX HYDR+ DBL PGTL THRD 2  [employer] UROLOGY- [id number] Left 1 Implanted         Drains: * No LDAs found *    Findings:   1. 1CM L. UPJ Stone easily lasered into small submillimeter fragments      INDICATIONS FOR PROCEDURE:  Consuelo Tolentino is a 60 y.o. male presents for left sided calculus. After the risks, benefits, alternatives, of the procedure were discussed with the patient, informed consent was obtained. The patient elected to proceed.     DETAILS OF THE PROCEDURE:  The patient was brought back from the preoperative holding area to the  operating suite, and was transferred to the operating table where the patient lay in  supine position. EPC's were in place, connected to the machine and the machine was turned on before induction. General endotracheal anesthesia was induced, and patient was prepped and draped in standard surgical fashion after being placed in dorsolithotomy position. A proper timeout was performed, preoperative antibiotics were given. We began by inserting a cystoscope with a 22 [place]ian sheath and 30 degree lens into the patient's urethral meatus and advancing into the bladder without complication. A pan cystoscopy was preformed and the bladder appeared unremarkable.   We then focused our attention on the left ureteral orifice, which we cannulated with our glidewire, advanced up to renal pelvis. We then used a dual lumen catheter to place a second wire. Once in good position, we advanced our flexible ureteroscope over the working wire to the renal pelvis under direct visualization. We identified the renal calculus and using a 200 micron holmium laser fiber we fragmented the calculus. It was dusted and the fragments appeared to be under 1 millimeter and could pass easily. At this time a complete pyeloscopy was preformed and no other substantial fragments were identified. We withdrew the ureteroscope and visualized the entire ureter. No stone fragments were identified. No damage to the ureter was identified. At this time, over the remaining glidewire, we placed a 6x26cm double J ureteral stent in the usual fashion, and we noted appropriate placement in the upper collecting system using fluoroscopy. There was a good curl noted in the bladder. We decided to leave a string at the end of the stent, which was attached with benzoin and steri-strips. The patient's bladder was drained and removed the scope and the procedure was subsequently terminated. Dr. Thaddeus Key was present for all critical portions of the procedure.     Plan:  Discharge home in good condition  The patient can pull the stent in 4 days          Electronically signed by Andrey Florence MD on 6/11/2021 at 11:16 PM

## 2022-06-08 ENCOUNTER — HOSPITAL ENCOUNTER (EMERGENCY)
Age: 67
Discharge: HOME | End: 2022-06-08
Attending: SPECIALIST
Payer: COMMERCIAL

## 2022-06-08 VITALS
TEMPERATURE: 99 F | HEART RATE: 83 BPM | SYSTOLIC BLOOD PRESSURE: 151 MMHG | OXYGEN SATURATION: 98 % | DIASTOLIC BLOOD PRESSURE: 89 MMHG | RESPIRATION RATE: 20 BRPM

## 2022-06-08 DIAGNOSIS — Z98.890 STATUS POST LASER LITHOTRIPSY OF URETERAL CALCULUS: ICD-10-CM

## 2022-06-08 DIAGNOSIS — R10.9 RIGHT FLANK PAIN: Primary | ICD-10-CM

## 2022-06-08 LAB
-: ABNORMAL
ABSOLUTE EOS #: 0 K/UL (ref 0–0.4)
ABSOLUTE LYMPH #: 0.5 K/UL (ref 1–4.8)
ABSOLUTE MONO #: 0.9 K/UL (ref 0.1–1.2)
AMORPHOUS: ABNORMAL
ANION GAP SERPL CALCULATED.3IONS-SCNC: 10 MMOL/L (ref 9–17)
BACTERIA: ABNORMAL
BASOPHILS # BLD: 0 % (ref 0–2)
BASOPHILS ABSOLUTE: 0 K/UL (ref 0–0.2)
BILIRUBIN URINE: NEGATIVE
BUN BLDV-MCNC: 16 MG/DL (ref 8–23)
CALCIUM SERPL-MCNC: 10.4 MG/DL (ref 8.6–10.4)
CHLORIDE BLD-SCNC: 101 MMOL/L (ref 98–107)
CO2: 28 MMOL/L (ref 20–31)
COLOR: YELLOW
CREAT SERPL-MCNC: 0.69 MG/DL (ref 0.7–1.2)
EOSINOPHILS RELATIVE PERCENT: 0 % (ref 1–4)
EPITHELIAL CELLS UA: ABNORMAL /HPF (ref 0–5)
GFR AFRICAN AMERICAN: >60 ML/MIN
GFR NON-AFRICAN AMERICAN: >60 ML/MIN
GFR SERPL CREATININE-BSD FRML MDRD: ABNORMAL ML/MIN/{1.73_M2}
GLUCOSE BLD-MCNC: 150 MG/DL (ref 70–99)
GLUCOSE URINE: NEGATIVE
HCT VFR BLD CALC: 46.9 % (ref 41–53)
HEMOGLOBIN: 15.8 G/DL (ref 13.5–17.5)
KETONES, URINE: ABNORMAL
LEUKOCYTE ESTERASE, URINE: NEGATIVE
LYMPHOCYTES # BLD: 5 % (ref 24–44)
MCH RBC QN AUTO: 32.3 PG (ref 26–34)
MCHC RBC AUTO-ENTMCNC: 33.7 G/DL (ref 31–37)
MCV RBC AUTO: 95.9 FL (ref 80–100)
MONOCYTES # BLD: 8 % (ref 2–11)
NITRITE, URINE: NEGATIVE
OTHER OBSERVATIONS UA: ABNORMAL
PDW BLD-RTO: 12.3 % (ref 12.5–15.4)
PH UA: 6 (ref 5–8)
PLATELET # BLD: 221 K/UL (ref 140–450)
PMV BLD AUTO: 9.2 FL (ref 6–12)
POTASSIUM SERPL-SCNC: 4 MMOL/L (ref 3.7–5.3)
PROTEIN UA: ABNORMAL
RBC # BLD: 4.89 M/UL (ref 4.5–5.9)
RBC UA: ABNORMAL /HPF (ref 0–2)
SEG NEUTROPHILS: 87 % (ref 36–66)
SEGMENTED NEUTROPHILS ABSOLUTE COUNT: 10.3 K/UL (ref 1.8–7.7)
SODIUM BLD-SCNC: 139 MMOL/L (ref 135–144)
SPECIFIC GRAVITY UA: 1.02 (ref 1–1.03)
TURBIDITY: ABNORMAL
URINE HGB: ABNORMAL
UROBILINOGEN, URINE: NORMAL
WBC # BLD: 11.8 K/UL (ref 3.5–11)
WBC UA: ABNORMAL /HPF (ref 0–5)

## 2022-06-08 PROCEDURE — 2580000003 HC RX 258: Performed by: SPECIALIST

## 2022-06-08 PROCEDURE — 85025 COMPLETE CBC W/AUTO DIFF WBC: CPT

## 2022-06-08 PROCEDURE — 99284 EMERGENCY DEPT VISIT MOD MDM: CPT

## 2022-06-08 PROCEDURE — 96374 THER/PROPH/DIAG INJ IV PUSH: CPT

## 2022-06-08 PROCEDURE — 80048 BASIC METABOLIC PNL TOTAL CA: CPT

## 2022-06-08 PROCEDURE — 81001 URINALYSIS AUTO W/SCOPE: CPT

## 2022-06-08 PROCEDURE — 6360000002 HC RX W HCPCS: Performed by: SPECIALIST

## 2022-06-08 PROCEDURE — 87086 URINE CULTURE/COLONY COUNT: CPT

## 2022-06-08 PROCEDURE — 96375 TX/PRO/DX INJ NEW DRUG ADDON: CPT

## 2022-06-08 PROCEDURE — 36415 COLL VENOUS BLD VENIPUNCTURE: CPT

## 2022-06-08 RX ORDER — HYDROCODONE BITARTRATE AND ACETAMINOPHEN 5; 325 MG/1; MG/1
1 TABLET ORAL EVERY 6 HOURS PRN
COMMUNITY

## 2022-06-08 RX ORDER — ONDANSETRON 4 MG/1
4 TABLET, ORALLY DISINTEGRATING ORAL EVERY 8 HOURS PRN
Qty: 12 TABLET | Refills: 0 | Status: SHIPPED | OUTPATIENT
Start: 2022-06-08

## 2022-06-08 RX ORDER — FENTANYL CITRATE 50 UG/ML
50 INJECTION, SOLUTION INTRAMUSCULAR; INTRAVENOUS ONCE
Status: COMPLETED | OUTPATIENT
Start: 2022-06-08 | End: 2022-06-08

## 2022-06-08 RX ORDER — ONDANSETRON 2 MG/ML
4 INJECTION INTRAMUSCULAR; INTRAVENOUS ONCE
Status: COMPLETED | OUTPATIENT
Start: 2022-06-08 | End: 2022-06-08

## 2022-06-08 RX ORDER — KETOROLAC TROMETHAMINE 30 MG/ML
30 INJECTION, SOLUTION INTRAMUSCULAR; INTRAVENOUS ONCE
Status: COMPLETED | OUTPATIENT
Start: 2022-06-08 | End: 2022-06-08

## 2022-06-08 RX ORDER — 0.9 % SODIUM CHLORIDE 0.9 %
1000 INTRAVENOUS SOLUTION INTRAVENOUS ONCE
Status: COMPLETED | OUTPATIENT
Start: 2022-06-08 | End: 2022-06-08

## 2022-06-08 RX ORDER — OXYCODONE HYDROCHLORIDE AND ACETAMINOPHEN 5; 325 MG/1; MG/1
1 TABLET ORAL EVERY 4 HOURS PRN
COMMUNITY

## 2022-06-08 RX ORDER — MORPHINE SULFATE 4 MG/ML
4 INJECTION, SOLUTION INTRAMUSCULAR; INTRAVENOUS ONCE
Status: DISCONTINUED | OUTPATIENT
Start: 2022-06-08 | End: 2022-06-08

## 2022-06-08 RX ADMIN — SODIUM CHLORIDE 1000 ML: 9 INJECTION, SOLUTION INTRAVENOUS at 21:14

## 2022-06-08 RX ADMIN — FENTANYL CITRATE 50 MCG: 50 INJECTION, SOLUTION INTRAMUSCULAR; INTRAVENOUS at 21:19

## 2022-06-08 RX ADMIN — KETOROLAC TROMETHAMINE 30 MG: 30 INJECTION, SOLUTION INTRAMUSCULAR at 22:59

## 2022-06-08 RX ADMIN — ONDANSETRON 4 MG: 2 INJECTION INTRAMUSCULAR; INTRAVENOUS at 22:59

## 2022-06-08 ASSESSMENT — PAIN SCALES - GENERAL
PAINLEVEL_OUTOF10: 3
PAINLEVEL_OUTOF10: 5

## 2022-06-08 ASSESSMENT — PAIN DESCRIPTION - LOCATION: LOCATION: FLANK

## 2022-06-08 ASSESSMENT — PAIN - FUNCTIONAL ASSESSMENT: PAIN_FUNCTIONAL_ASSESSMENT: 0-10

## 2022-06-08 ASSESSMENT — PAIN DESCRIPTION - ORIENTATION: ORIENTATION: RIGHT

## 2022-06-09 NOTE — ED PROVIDER NOTES
Terence Moise [facility]78 ENCOUNTER      Pt Name: Parker Gonzalez  MRN: [id number]  [place] 1955  Date of evaluation: 6/9/22      CHIEF COMPLAINT       Chief Complaint   Patient presents with    Flank Pain     lithotripsy today, pain uncontrolled    Emesis         HISTORY OF PRESENT ILLNESS    Parker Gonzalez is a 73 y.o. male who presents to the emergency department brought in via EMS for evaluation of right flank pain since about 6:30 PM prior to arrival.  Patient underwent lithotripsy for 1.5 cm right kidney stone at [facility] and was discharged home at 10 AM in the morning. He was prescribed Keflex and Norco and patient states he vomited both after he took medications at about 6:30 PM.  He has had nausea and 4 episodes of vomiting and also admits to having slight dysuria and hematuria. He has history of neurogenic bladder. He has had 6 episodes of kidney stones so far over last 25 years. He also has history of lumbar disc disease and scoliosis. He denies any fever, chills, lightheadedness or dizziness. He was given Zofran per EMS and his nausea has improved. There are no exacerbating or relieving factors. REVIEW OF SYSTEMS       Review of Systems    All systems reviewed and negative unless noted in HPI. The patient denies fever or constitutional symptoms. Denies any sore throat or rhinorrhea. Denies any neck pain or stiffness. Denies chest pain or shortness of breath. Complains of right flank pain, nausea and vomiting     Denies any weakness, numbness or focal neurologic deficit. Denies any skin rash or edema. No easy bruising or bleeding.    Denies any polyuria, polydypsia       PAST MEDICAL HISTORY    has a past medical history of Arthritis, Chronic kidney disease, DDD (degenerative disc disease), cervical, Difficulty urinating, Fracture lumbar vertebra-closed ([facility] [address] GERD (gastroesophageal reflux disease), History of fracture, History of stress mouth daily      senna (SENOKOT) 8.6 MG tablet Take 1 tablet by mouth daily Patient takes 6 tablets dailyHistorical Med      fluocinonide (LIDEX) 0.05 % cream Apply 1 oz topically nightly as needed Apply topically 2 times daily. , Topical, NIGHTLY PRN, Until Discontinued, Historical Med             ALLERGIES     is allergic to nylon, other, and morphine. FAMILY HISTORY     He indicated that the status of his mother is unknown. He indicated that the status of his father is unknown. He indicated that the status of his brother is unknown.     family history includes Arthritis in his mother; Heart Disease in his brother; Other in his father and mother. SOCIAL HISTORY      reports that he has never smoked. He has never used smokeless tobacco. He reports previous alcohol use. He reports that he does not use drugs. PHYSICAL EXAM     INITIAL VITALS:  oral temperature is 99 °F (37.2 °C). His blood pressure is 151/89 (abnormal) and his pulse is 83. His respiration is 20 and oxygen saturation is 98%. Physical Exam  Vitals and nursing note reviewed. Constitutional:       Appearance: He is well-developed. HENT:      Head: Normocephalic and atraumatic. Nose: Nose normal.   Eyes:      Extraocular Movements: Extraocular movements intact. Pupils: Pupils are equal, round, and reactive to light. Cardiovascular:      Rate and Rhythm: Normal rate and regular rhythm. Heart sounds: Normal heart sounds. No murmur heard. Pulmonary:      Effort: Pulmonary effort is normal. No respiratory distress. Breath sounds: Normal breath sounds. Abdominal:      General: Bowel sounds are normal. There is no distension or abdominal bruit. Palpations: Abdomen is soft. There is no pulsatile mass. Tenderness: There is no abdominal tenderness. There is right CVA tenderness. Negative signs include Herron's sign and McBurney's sign.    Musculoskeletal:      Cervical back: Normal range of motion and neck supple. Skin:     General: Skin is warm and dry. Neurological:      General: No focal deficit present. Mental Status: He is alert and oriented to person, place, and time. DIFFERENTIAL DIAGNOSIS/ MDM:     Right flank pain status post lithotripsy  Will obtain CBC, chemistries, urinalysis, give IV fluids and control the pain and nausea and then reevaluate    DIAGNOSTIC RESULTS     EKG: All EKG's are interpreted by the Emergency Department Physician who either signs or Co-signs this chart in the absence of a cardiologist.    None obtained    RADIOLOGY:   Interpretation per the Radiologist below, if available at the time of this note:    No results found.       LABS:  Results for orders placed or performed during the hospital encounter of 06/08/22   CBC with Auto Differential   Result Value Ref Range    WBC 11.8 (H) 3.5 - 11.0 k/uL    RBC 4.89 4.5 - 5.9 m/uL    Hemoglobin 15.8 13.5 - 17.5 g/dL    Hematocrit 46.9 41 - 53 %    MCV 95.9 80 - 100 fL    MCH 32.3 26 - 34 pg    MCHC 33.7 31 - 37 g/dL    RDW 12.3 (L) 12.5 - 15.4 %    Platelets 501 371 - 144 k/uL    MPV 9.2 6.0 - 12.0 fL    Seg Neutrophils 87 (H) 36 - 66 %    Lymphocytes 5 (L) 24 - 44 %    Monocytes 8 2 - 11 %    Eosinophils % 0 (L) 1 - 4 %    Basophils 0 0 - 2 %    Segs Absolute 10.30 (H) 1.8 - 7.7 k/uL    Absolute Lymph # 0.50 (L) 1.0 - 4.8 k/uL    Absolute Mono # 0.90 0.1 - 1.2 k/uL    Absolute Eos # 0.00 0.0 - 0.4 k/uL    Basophils Absolute 0.00 0.0 - 0.2 k/uL   Basic Metabolic Panel w/ Reflex to MG   Result Value Ref Range    Glucose 150 (H) 70 - 99 mg/dL    BUN 16 8 - 23 mg/dL    CREATININE 0.69 (L) 0.70 - 1.20 mg/dL    Calcium 10.4 8.6 - 10.4 mg/dL    Sodium 139 135 - 144 mmol/L    Potassium 4.0 3.7 - 5.3 mmol/L    Chloride 101 98 - 107 mmol/L    CO2 28 20 - 31 mmol/L    Anion Gap 10 9 - 17 mmol/L    GFR Non-African American >60 >60 mL/min    GFR African American >60 >60 mL/min    GFR Comment         Urinalysis with Reflex to Culture plenty of oral fluids, continue current medications, Zofran ODT as needed for the nausea, follow-up with PCP and your urologist Dr. Rei Anand, call his office tomorrow morning for appointment, return if worse. The patient and significant other understand that at this time there is no evidence for a more malignant underlying process, but also understands that early in the process of an illness or injury, an emergency department workup can be falsely reassuring. Routine discharge counseling was given, and it is understood that worsening, changing or persistent symptoms should prompt an immediate call or follow up with their primary physician or return to the emergency department. The importance of appropriate follow up was also discussed. I have reviewed the disposition diagnosis. I have answered the questions and given discharge instructions. There was voiced understanding of these instructions and no further questions or complaints. CONSULTS:  None    PROCEDURES:  None    FINAL IMPRESSION      1. Right flank pain    2. Status post laser lithotripsy of ureteral calculus          DISPOSITION/PLAN       PATIENT REFERRED TO:  Mendel Mckinney MD  [facility]  [address]  [phone]    Call in 1 day  For reevaluation of current symptoms    [facility] ED  [address] [place] [address]  [phone]    If symptoms worsen      DISCHARGE MEDICATIONS:  Discharge Medication List as of 6/8/2022 10:36 PM          (Please note that portions of this note were completed with a voice recognition program.  Efforts were made to edit the dictations but occasionally words are mis-transcribed.)    Joni Apley, MD,, MD, F.A.C.E.P.   Attending Emergency Medicine Physician     Joni Apley, MD  06/09/22 [id number]

## 2022-06-09 NOTE — ED NOTES
Pt to ER per EMS, transferred to bed, full assist. Pt reports lithotripsy today, now reporting severe right side flank pain, started about 1900. Pt reports stent not placed, pt also reports he is having troubles urinating now. Pt also reports emesis, reports 4 episodes since 1730. Pt rates pain 5/10, reports taking oxycodone and tylenol at 1730, also reports taking keflex, but had emesis soon afterwards. Pt reports neurogenic bladder, and is requesting a olivo to be placed, MD updated.  Pt appears uncomfortable, but remains calm, cooperative, respers even non labored, skin warm pink, no distress, here for eval.      Elizabeth Barragan RN  06/08/22 2232

## 2022-06-09 NOTE — ED NOTES
Leal drainage bag switched to leg bag, pt verbalizes understanding of use and care.       Andrew Gatica RN  06/08/22 4429

## 2022-06-10 LAB
CULTURE: NO GROWTH
SPECIMEN DESCRIPTION: NORMAL

## 2024-08-03 ENCOUNTER — HOSPITAL ENCOUNTER (EMERGENCY)
Age: 69
Discharge: HOME | End: 2024-08-03
Attending: EMERGENCY MEDICINE
Payer: COMMERCIAL

## 2024-08-03 ENCOUNTER — APPOINTMENT (OUTPATIENT)
Dept: CT IMAGING | Age: 69
End: 2024-08-03
Payer: COMMERCIAL

## 2024-08-03 VITALS
SYSTOLIC BLOOD PRESSURE: 130 MMHG | DIASTOLIC BLOOD PRESSURE: 69 MMHG | BODY MASS INDEX: 28.12 KG/M2 | HEART RATE: 99 BPM | OXYGEN SATURATION: 90 % | RESPIRATION RATE: 16 BRPM | HEIGHT: 66 IN | TEMPERATURE: 99 F | WEIGHT: 175 LBS

## 2024-08-03 DIAGNOSIS — N23 RENAL COLIC: Primary | ICD-10-CM

## 2024-08-03 LAB
ALBUMIN SERPL-MCNC: 4.7 G/DL (ref 3.5–5.2)
ALBUMIN/GLOB SERPL: 1.4 {RATIO} (ref 1–2.5)
ALP SERPL-CCNC: 73 U/L (ref 40–129)
ALT SERPL-CCNC: 21 U/L (ref 5–41)
ANION GAP SERPL CALCULATED.3IONS-SCNC: 14 MMOL/L (ref 9–17)
AST SERPL-CCNC: 19 U/L
BACTERIA URNS QL MICRO: ABNORMAL
BASOPHILS # BLD: 0 K/UL (ref 0–0.2)
BASOPHILS NFR BLD: 0 % (ref 0–2)
BILIRUB SERPL-MCNC: 0.8 MG/DL (ref 0.3–1.2)
BILIRUB UR QL STRIP: NEGATIVE
BUN SERPL-MCNC: 17 MG/DL (ref 8–23)
CALCIUM SERPL-MCNC: 10 MG/DL (ref 8.6–10.4)
CHARACTER UR: ABNORMAL
CHLORIDE SERPL-SCNC: 102 MMOL/L (ref 98–107)
CLARITY UR: CLEAR
CO2 SERPL-SCNC: 25 MMOL/L (ref 20–31)
COLOR UR: YELLOW
CREAT SERPL-MCNC: 0.8 MG/DL (ref 0.7–1.2)
EOSINOPHIL # BLD: 0 K/UL (ref 0–0.4)
EOSINOPHILS RELATIVE PERCENT: 0 % (ref 1–4)
EPI CELLS #/AREA URNS HPF: ABNORMAL /HPF (ref 0–5)
ERYTHROCYTE [DISTWIDTH] IN BLOOD BY AUTOMATED COUNT: 12.5 % (ref 12.5–15.4)
GFR, ESTIMATED: >90 ML/MIN/1.73M2
GLUCOSE SERPL-MCNC: 127 MG/DL (ref 70–99)
GLUCOSE UR STRIP-MCNC: NEGATIVE MG/DL
HCT VFR BLD AUTO: 47.9 % (ref 41–53)
HGB BLD-MCNC: 16.1 G/DL (ref 13.5–17.5)
HGB UR QL STRIP.AUTO: ABNORMAL
KETONES UR STRIP-MCNC: ABNORMAL MG/DL
LEUKOCYTE ESTERASE UR QL STRIP: NEGATIVE
LYMPHOCYTES NFR BLD: 1.7 K/UL (ref 1–4.8)
LYMPHOCYTES RELATIVE PERCENT: 12 % (ref 24–44)
MCH RBC QN AUTO: 33 PG (ref 26–34)
MCHC RBC AUTO-ENTMCNC: 33.5 G/DL (ref 31–37)
MCV RBC AUTO: 98.3 FL (ref 80–100)
MONOCYTES NFR BLD: 1.1 K/UL (ref 0.1–1.2)
MONOCYTES NFR BLD: 7 % (ref 2–11)
NEUTROPHILS NFR BLD: 81 % (ref 36–66)
NEUTS SEG NFR BLD: 11.7 K/UL (ref 1.8–7.7)
NITRITE UR QL STRIP: NEGATIVE
PH UR STRIP: 6 [PH] (ref 5–8)
PLATELET # BLD AUTO: 262 K/UL (ref 140–450)
PMV BLD AUTO: 9 FL (ref 6–12)
POTASSIUM SERPL-SCNC: 4.1 MMOL/L (ref 3.7–5.3)
PROT SERPL-MCNC: 8 G/DL (ref 6.4–8.3)
PROT UR STRIP-MCNC: NEGATIVE MG/DL
RBC # BLD AUTO: 4.87 M/UL (ref 4.5–5.9)
RBC #/AREA URNS HPF: ABNORMAL /HPF (ref 0–2)
SODIUM SERPL-SCNC: 141 MMOL/L (ref 135–144)
SP GR UR STRIP: 1.02 (ref 1–1.03)
UROBILINOGEN UR STRIP-ACNC: NORMAL EU/DL (ref 0–1)
WBC #/AREA URNS HPF: ABNORMAL /HPF (ref 0–5)
WBC OTHER # BLD: 14.5 K/UL (ref 3.5–11)

## 2024-08-03 PROCEDURE — 96376 TX/PRO/DX INJ SAME DRUG ADON: CPT

## 2024-08-03 PROCEDURE — 99284 EMERGENCY DEPT VISIT MOD MDM: CPT

## 2024-08-03 PROCEDURE — 96372 THER/PROPH/DIAG INJ SC/IM: CPT

## 2024-08-03 PROCEDURE — 2580000003 HC RX 258: Performed by: NURSE PRACTITIONER

## 2024-08-03 PROCEDURE — 96361 HYDRATE IV INFUSION ADD-ON: CPT

## 2024-08-03 PROCEDURE — 87086 URINE CULTURE/COLONY COUNT: CPT

## 2024-08-03 PROCEDURE — 85025 COMPLETE CBC W/AUTO DIFF WBC: CPT

## 2024-08-03 PROCEDURE — 6370000000 HC RX 637 (ALT 250 FOR IP): Performed by: NURSE PRACTITIONER

## 2024-08-03 PROCEDURE — 81001 URINALYSIS AUTO W/SCOPE: CPT

## 2024-08-03 PROCEDURE — 80053 COMPREHEN METABOLIC PANEL: CPT

## 2024-08-03 PROCEDURE — 74176 CT ABD & PELVIS W/O CONTRAST: CPT

## 2024-08-03 PROCEDURE — 6360000002 HC RX W HCPCS: Performed by: NURSE PRACTITIONER

## 2024-08-03 PROCEDURE — 96374 THER/PROPH/DIAG INJ IV PUSH: CPT

## 2024-08-03 PROCEDURE — 96375 TX/PRO/DX INJ NEW DRUG ADDON: CPT

## 2024-08-03 RX ORDER — ONDANSETRON 4 MG/1
4 TABLET, ORALLY DISINTEGRATING ORAL 3 TIMES DAILY PRN
Qty: 21 TABLET | Refills: 0 | Status: SHIPPED | OUTPATIENT
Start: 2024-08-03

## 2024-08-03 RX ORDER — CEPHALEXIN 500 MG/1
500 CAPSULE ORAL 2 TIMES DAILY
Qty: 14 CAPSULE | Refills: 0 | Status: SHIPPED | OUTPATIENT
Start: 2024-08-03 | End: 2024-08-10

## 2024-08-03 RX ORDER — CEFTRIAXONE 1 G/1
1000 INJECTION, POWDER, FOR SOLUTION INTRAMUSCULAR; INTRAVENOUS ONCE
Status: COMPLETED | OUTPATIENT
Start: 2024-08-03 | End: 2024-08-03

## 2024-08-03 RX ORDER — ONDANSETRON 2 MG/ML
4 INJECTION INTRAMUSCULAR; INTRAVENOUS ONCE
Status: COMPLETED | OUTPATIENT
Start: 2024-08-03 | End: 2024-08-03

## 2024-08-03 RX ORDER — WATER 10 ML/10ML
INJECTION INTRAMUSCULAR; INTRAVENOUS; SUBCUTANEOUS
Status: DISCONTINUED
Start: 2024-08-03 | End: 2024-08-03 | Stop reason: HOSPADM

## 2024-08-03 RX ORDER — 0.9 % SODIUM CHLORIDE 0.9 %
1000 INTRAVENOUS SOLUTION INTRAVENOUS ONCE
Status: COMPLETED | OUTPATIENT
Start: 2024-08-03 | End: 2024-08-03

## 2024-08-03 RX ORDER — CEPHALEXIN 250 MG/1
500 CAPSULE ORAL ONCE
Status: DISCONTINUED | OUTPATIENT
Start: 2024-08-03 | End: 2024-08-03

## 2024-08-03 RX ORDER — KETOROLAC TROMETHAMINE 15 MG/ML
15 INJECTION, SOLUTION INTRAMUSCULAR; INTRAVENOUS ONCE
Status: COMPLETED | OUTPATIENT
Start: 2024-08-03 | End: 2024-08-03

## 2024-08-03 RX ORDER — TAMSULOSIN HYDROCHLORIDE 0.4 MG/1
0.4 CAPSULE ORAL ONCE
Status: COMPLETED | OUTPATIENT
Start: 2024-08-03 | End: 2024-08-03

## 2024-08-03 RX ORDER — MORPHINE SULFATE 4 MG/ML
4 INJECTION, SOLUTION INTRAMUSCULAR; INTRAVENOUS ONCE
Status: COMPLETED | OUTPATIENT
Start: 2024-08-03 | End: 2024-08-03

## 2024-08-03 RX ADMIN — TAMSULOSIN HYDROCHLORIDE 0.4 MG: 0.4 CAPSULE ORAL at 12:18

## 2024-08-03 RX ADMIN — SODIUM CHLORIDE 1000 ML: 9 INJECTION, SOLUTION INTRAVENOUS at 10:37

## 2024-08-03 RX ADMIN — CEFTRIAXONE SODIUM 1000 MG: 1 INJECTION, POWDER, FOR SOLUTION INTRAMUSCULAR; INTRAVENOUS at 15:07

## 2024-08-03 RX ADMIN — HYDROMORPHONE HYDROCHLORIDE 0.5 MG: 1 INJECTION, SOLUTION INTRAMUSCULAR; INTRAVENOUS; SUBCUTANEOUS at 13:35

## 2024-08-03 RX ADMIN — ONDANSETRON 4 MG: 2 INJECTION INTRAMUSCULAR; INTRAVENOUS at 13:33

## 2024-08-03 RX ADMIN — KETOROLAC TROMETHAMINE 15 MG: 15 INJECTION, SOLUTION INTRAMUSCULAR; INTRAVENOUS at 10:38

## 2024-08-03 RX ADMIN — MORPHINE SULFATE 4 MG: 4 INJECTION INTRAVENOUS at 10:39

## 2024-08-03 RX ADMIN — ONDANSETRON 4 MG: 2 INJECTION INTRAMUSCULAR; INTRAVENOUS at 10:37

## 2024-08-03 ASSESSMENT — PAIN DESCRIPTION - ORIENTATION
ORIENTATION: LEFT

## 2024-08-03 ASSESSMENT — ENCOUNTER SYMPTOMS
CONSTIPATION: 0
ABDOMINAL PAIN: 0
COUGH: 0
DIARRHEA: 0
VOMITING: 1
BACK PAIN: 0
NAUSEA: 1
EYE DISCHARGE: 0
SHORTNESS OF BREATH: 0

## 2024-08-03 ASSESSMENT — PAIN DESCRIPTION - LOCATION
LOCATION: FLANK

## 2024-08-03 ASSESSMENT — PAIN DESCRIPTION - PAIN TYPE: TYPE: ACUTE PAIN

## 2024-08-03 ASSESSMENT — PAIN - FUNCTIONAL ASSESSMENT: PAIN_FUNCTIONAL_ASSESSMENT: 0-10

## 2024-08-03 NOTE — DISCHARGE INSTRUCTIONS
Start taking previously prescribed Percocet every 4-6 hours as needed for acute pain, notify your pain prescription prescriber/physician of the acute pain, requiring increased dose frequency.    Take Flomax daily for the next 14 days.    Tylenol or ibuprofen as directed over-the-counter for mild to moderate pain.    Zofran as prescribed to help with nausea or vomiting.    Complete Keflex as prescribed antibiotic.    Return to the ER: Fevers, continued or worsening left flank pain, abdominal pain, weakness or confusion, no urine output, or any other concerning symptoms.

## 2024-08-03 NOTE — PROGRESS NOTES
SPIRITUAL CARE DEPARTMENT [place]  PROGRESS NOTE    Room # ER11/ER11   Name: Jean Archuleta            [employer]ism: [employer]    Reason for visit: Emotional Distress    I visited the patient.    Admit Date & Time: 8/3/2024 10:11 AM    Assessment:  Jean Archuleta is a 68 y.o. male in the hospital because no principal problem. Upon entering the room Pt was welcoming and friendly. Pt was in a lot of pain and wanted to share. Pt was open to conversation and prayer.      Intervention:  I introduced myself and my title as  I offered space for Pt  to express feelings, needs, and concerns and provided a ministry presence.  provided support and care to Pt. Active listening and encouragement were offered. Prayer was provided as a source of comfort and encouragement.    Outcome:  Pt expressed sadness and a recent trauma in his life. Pt expressed his leonidas in God and was thankful for his parents godly influence. Thanked  for visit.    Plan:  Chaplains will remain available to offer spiritual and emotional support as needed.    Electronically signed by Chaplain JELANI, on 8/3/2024 at 3:30 PM.  Spiritual Care Department  [facility]      08/03/24 1527   Encounter Summary   Encounter Overview/Reason Initial Encounter;Spiritual/Emotional Needs   Service Provided For Patient   Referral/Consult From [facility] [place]   Support System Family members   Last Encounter  08/03/24   Complexity of Encounter High   Begin Time 1435   End Time  1500   Total Time Calculated 25 min   Crisis   Type Trauma;Emotional distress   Spiritual/Emotional needs   Type Spiritual Support;Emotional Distress   Grief, Loss, and Adjustments   Type Life Adjustments;Adjustment to illness   Assessment/Intervention/Outcome   Assessment Calm;Coping   Intervention Active listening;Discussed belief system/Spiritism practices/leonidas;Discussed meaning/purpose;Discussed relationship with God;Prayer

## 2024-08-03 NOTE — ED PROVIDER NOTES
[facility] Emergency Department  [address]  [place] [facility] [place] [facility] [address]  Phone: [phone]  Fax: [phone]      Attending Physician Attestation    I performed a history and physical examination of the patient and discussed management with the mid level provideer. I reviewed the mid level provider's note and agree with the documented findings and plan of care. Any areas of disagreement are noted on the chart. I was personally present for the key portions of any procedures. I have documented in the chart those procedures where I was not present during the key portions. I have reviewed the emergency nurses triage note. I agree with the chief complaint, past medical history, past surgical history, allergies, medications, social and family history as documented unless otherwise noted below. Documentation of the HPI, Physical Exam and Medical Decision Making performed by mid level providers is based on my personal performance of the HPI, PE and MDM. For Physician Assistant/ Nurse Practitioner cases/documentation I have personally evaluated this patient and have completed at least one if not all key elements of the E/M (history, physical exam, and MDM). Additional findings are as noted.      CHIEF COMPLAINT       Chief Complaint   Patient presents with    Flank Pain     Left flank pain starting yesterday afternoon; hx kidney stones bilat.          PAST MEDICAL HISTORY    has a past medical history of Arthritis, Chronic kidney disease, DDD (degenerative disc disease), cervical, Difficulty urinating, Fracture lumbar vertebra-closed (HCC), GERD (gastroesophageal reflux disease), History of fracture, History of stress test, Hyperlipidemia, Kidney stone, Neurogenic bladder, Palpitations, Thyroid disease, Under care of team, Under care of team, Under care of team, Under care of team, Ureteral stone with hydronephrosis, and Wears glasses.    SURGICAL HISTORY      has a past surgical history that

## 2024-08-03 NOTE — ED PROVIDER NOTES
[facility] EMERGENCY DEPARTMENT  EMERGENCY DEPARTMENT ENCOUNTER      Pt Name: Jean Archuleta  MRN: [id number]  Birthdate 1955  Date of evaluation: 8/3/2024  Provider: NICOLE Reyna CNP  3:40 PM    CHIEF COMPLAINT       Chief Complaint   Patient presents with    Flank Pain     Left flank pain starting yesterday afternoon; hx kidney stones bilat.          HISTORY OF PRESENT ILLNESS    Jean Archuleta is a 68 y.o. male who presents to the emergency department      This is a nontoxic-appearing 68-year-old male presenting to the emergency department via private auto, the patient reports that he has a history of renal colic, follows with Dr. Villafana for urology; has had to have previous lithotripsy and stenting; states that yesterday around 3 or 4 PM he started with left flank pain, this morning he had continued left flank pain, nausea vomiting; 8 hours prior to arrival he took one of his prescribed Percocet pain pills without relief of the symptoms, prompting the patient come to the emergency department for further evaluation of renal colic, he has had some chills but no measured fevers no chest pain shortness of breath or breathing difficulty, denies abdominal pain testicular pain, has not noticed any urinary changes or hematuria he does have a history of neurogenic bladder.  Normal bowel movements.  No falls or traumas.    The history is provided by the patient and medical records.       Nursing Notes were reviewed.    REVIEW OF SYSTEMS       Review of Systems   Constitutional:  Positive for chills. Negative for fever.   HENT:  Negative for congestion, ear pain and sneezing.    Eyes:  Negative for discharge and visual disturbance.   Respiratory:  Negative for cough and shortness of breath.    Cardiovascular:  Negative for chest pain and palpitations.   Gastrointestinal:  Positive for nausea and vomiting. Negative for abdominal pain, constipation and diarrhea.   Genitourinary:  Positive  for flank pain. Negative for dysuria, frequency, penile pain, scrotal swelling and testicular pain.   Musculoskeletal:  Negative for back pain and neck pain.   Skin:  Negative for rash and wound.   Neurological:  Negative for weakness, numbness and headaches.   Psychiatric/Behavioral:  Negative for confusion and suicidal ideas.        Except as noted above the remainder of the review of systems was reviewed and negative.       PAST MEDICAL HISTORY     Past Medical History:   Diagnosis Date    Arthritis     Chronic kidney disease     DDD (degenerative disc disease), cervical     lumbar    Difficulty urinating     Fracture lumbar vertebra-closed (HCC)     chronic fracture to T11 T12    GERD (gastroesophageal reflux disease)     History of fracture     Sacrum, pelvis, ribs    History of stress test     x2    Hyperlipidemia     Kidney stone     Left    Neurogenic bladder     Palpitations     Thyroid disease     Hyperparathyroidism    Under care of team 06/10/2021    Pcp: Mahesh Thapa,[place], last visit 2021    Under care of team 06/10/2021    Urology: Mahesh Wilcox, last visit 2/2021    Under care of team 06/10/2021    Orthopedic: Becky Graffvania, last visit 4/2021    Under care of team 06/10/2021    Ortho: , last visit 2020    Ureteral stone with hydronephrosis 10/27/2014    Wears glasses          SURGICAL HISTORY       Past Surgical History:   Procedure Laterality Date    APPENDECTOMY      CARPAL TUNNEL RELEASE Right     7/29/16    COLONOSCOPY      CRANIOTOMY      CYSTOSCOPY  10/28/14    ureteroscopy, left ureteral stent placement    CYSTOSCOPY Right 5/1/2019    CYSTOSCOPY, INSERTION OCCLUSIVE BALLOON, PT GOING TO INTERVENTIONAL RAD performed by Denver Villafana MD at [facility] OR    CYSTOSCOPY Left 06/11/2021    laser,litho,ureteroscopy,stent placement    ENDOSCOPY, COLON, DIAGNOSTIC      HERNIA REPAIR      ingenal, umbilical    HERNIA REPAIR  08/29/2016    umbilical    HERNIA REPAIR Left 9/1/2020

## 2024-08-04 LAB
MICROORGANISM SPEC CULT: NORMAL
SERVICE CMNT-IMP: NORMAL
SPECIMEN DESCRIPTION: NORMAL

## 2024-08-14 ENCOUNTER — APPOINTMENT (OUTPATIENT)
Dept: CT IMAGING | Age: 69
End: 2024-08-14
Payer: COMMERCIAL

## 2024-08-14 ENCOUNTER — APPOINTMENT (OUTPATIENT)
Dept: GENERAL RADIOLOGY | Age: 69
End: 2024-08-14
Payer: COMMERCIAL

## 2024-08-14 ENCOUNTER — HOSPITAL ENCOUNTER (EMERGENCY)
Age: 69
Discharge: HOME | End: 2024-08-14
Attending: EMERGENCY MEDICINE
Payer: COMMERCIAL

## 2024-08-14 VITALS
WEIGHT: 170 LBS | HEART RATE: 111 BPM | BODY MASS INDEX: 27.32 KG/M2 | SYSTOLIC BLOOD PRESSURE: 145 MMHG | RESPIRATION RATE: 14 BRPM | TEMPERATURE: 99.6 F | DIASTOLIC BLOOD PRESSURE: 80 MMHG | HEIGHT: 66 IN | OXYGEN SATURATION: 93 %

## 2024-08-14 DIAGNOSIS — K52.9 COLITIS: Primary | ICD-10-CM

## 2024-08-14 DIAGNOSIS — N21.0 BLADDER STONE: ICD-10-CM

## 2024-08-14 LAB
ALBUMIN SERPL-MCNC: 3.6 G/DL (ref 3.5–5.2)
ALBUMIN/GLOB SERPL: 1.2 {RATIO} (ref 1–2.5)
ALP SERPL-CCNC: 76 U/L (ref 40–129)
ALT SERPL-CCNC: 14 U/L (ref 5–41)
ANION GAP SERPL CALCULATED.3IONS-SCNC: 12 MMOL/L (ref 9–17)
AST SERPL-CCNC: 12 U/L
BASOPHILS # BLD: 0.1 K/UL (ref 0–0.2)
BASOPHILS NFR BLD: 0 % (ref 0–2)
BILIRUB SERPL-MCNC: 0.3 MG/DL (ref 0.3–1.2)
BUN SERPL-MCNC: 14 MG/DL (ref 8–23)
CALCIUM SERPL-MCNC: 8.5 MG/DL (ref 8.6–10.4)
CHLORIDE SERPL-SCNC: 101 MMOL/L (ref 98–107)
CO2 SERPL-SCNC: 25 MMOL/L (ref 20–31)
CREAT SERPL-MCNC: 0.6 MG/DL (ref 0.7–1.2)
DATE, STOOL #1: ABNORMAL
EOSINOPHIL # BLD: 0.1 K/UL (ref 0–0.4)
EOSINOPHILS RELATIVE PERCENT: 1 % (ref 1–4)
ERYTHROCYTE [DISTWIDTH] IN BLOOD BY AUTOMATED COUNT: 12.2 % (ref 12.5–15.4)
GFR, ESTIMATED: >90 ML/MIN/1.73M2
GLUCOSE SERPL-MCNC: 119 MG/DL (ref 70–99)
HCT VFR BLD AUTO: 44.9 % (ref 41–53)
HEMOCCULT SP1 STL QL: POSITIVE
HGB BLD-MCNC: 15 G/DL (ref 13.5–17.5)
LACTATE BLDV-SCNC: 1.3 MMOL/L (ref 0.5–2.2)
LYMPHOCYTES NFR BLD: 1.4 K/UL (ref 1–4.8)
LYMPHOCYTES RELATIVE PERCENT: 9 % (ref 24–44)
MCH RBC QN AUTO: 32.4 PG (ref 26–34)
MCHC RBC AUTO-ENTMCNC: 33.5 G/DL (ref 31–37)
MCV RBC AUTO: 96.8 FL (ref 80–100)
MONOCYTES NFR BLD: 1.2 K/UL (ref 0.1–1.2)
MONOCYTES NFR BLD: 8 % (ref 2–11)
NEUTROPHILS NFR BLD: 82 % (ref 36–66)
NEUTS SEG NFR BLD: 12.9 K/UL (ref 1.8–7.7)
PLATELET # BLD AUTO: 350 K/UL (ref 140–450)
PMV BLD AUTO: 7.6 FL (ref 6–12)
POTASSIUM SERPL-SCNC: 3.4 MMOL/L (ref 3.7–5.3)
PROT SERPL-MCNC: 6.5 G/DL (ref 6.4–8.3)
RBC # BLD AUTO: 4.64 M/UL (ref 4.5–5.9)
SODIUM SERPL-SCNC: 138 MMOL/L (ref 135–144)
TIME, STOOL #1: 1950
TROPONIN I SERPL HS-MCNC: 8 NG/L (ref 0–22)
WBC OTHER # BLD: 15.7 K/UL (ref 3.5–11)

## 2024-08-14 PROCEDURE — 82270 OCCULT BLOOD FECES: CPT

## 2024-08-14 PROCEDURE — 6370000000 HC RX 637 (ALT 250 FOR IP): Performed by: PHYSICIAN ASSISTANT

## 2024-08-14 PROCEDURE — 85025 COMPLETE CBC W/AUTO DIFF WBC: CPT

## 2024-08-14 PROCEDURE — 87506 IADNA-DNA/RNA PROBE TQ 6-11: CPT

## 2024-08-14 PROCEDURE — 84484 ASSAY OF TROPONIN QUANT: CPT

## 2024-08-14 PROCEDURE — 87324 CLOSTRIDIUM AG IA: CPT

## 2024-08-14 PROCEDURE — 71045 X-RAY EXAM CHEST 1 VIEW: CPT

## 2024-08-14 PROCEDURE — 2580000003 HC RX 258: Performed by: PHYSICIAN ASSISTANT

## 2024-08-14 PROCEDURE — 83605 ASSAY OF LACTIC ACID: CPT

## 2024-08-14 PROCEDURE — 74177 CT ABD & PELVIS W/CONTRAST: CPT

## 2024-08-14 PROCEDURE — 87449 NOS EACH ORGANISM AG IA: CPT

## 2024-08-14 PROCEDURE — 87493 C DIFF AMPLIFIED PROBE: CPT

## 2024-08-14 PROCEDURE — 36415 COLL VENOUS BLD VENIPUNCTURE: CPT

## 2024-08-14 PROCEDURE — 80053 COMPREHEN METABOLIC PANEL: CPT

## 2024-08-14 PROCEDURE — 99285 EMERGENCY DEPT VISIT HI MDM: CPT

## 2024-08-14 PROCEDURE — 6360000004 HC RX CONTRAST MEDICATION: Performed by: PHYSICIAN ASSISTANT

## 2024-08-14 RX ORDER — SODIUM CHLORIDE 0.9 % (FLUSH) 0.9 %
10 SYRINGE (ML) INJECTION 2 TIMES DAILY
Status: DISCONTINUED | OUTPATIENT
Start: 2024-08-14 | End: 2024-08-14 | Stop reason: HOSPADM

## 2024-08-14 RX ORDER — 0.9 % SODIUM CHLORIDE 0.9 %
1000 INTRAVENOUS SOLUTION INTRAVENOUS ONCE
Status: COMPLETED | OUTPATIENT
Start: 2024-08-14 | End: 2024-08-14

## 2024-08-14 RX ORDER — 0.9 % SODIUM CHLORIDE 0.9 %
80 INTRAVENOUS SOLUTION INTRAVENOUS ONCE
Status: DISCONTINUED | OUTPATIENT
Start: 2024-08-14 | End: 2024-08-14 | Stop reason: HOSPADM

## 2024-08-14 RX ORDER — AMOXICILLIN AND CLAVULANATE POTASSIUM 875; 125 MG/1; MG/1
1 TABLET, FILM COATED ORAL 2 TIMES DAILY
Qty: 14 TABLET | Refills: 0 | Status: SHIPPED | OUTPATIENT
Start: 2024-08-14 | End: 2024-08-21

## 2024-08-14 RX ORDER — AMOXICILLIN AND CLAVULANATE POTASSIUM 875; 125 MG/1; MG/1
1 TABLET, FILM COATED ORAL ONCE
Status: COMPLETED | OUTPATIENT
Start: 2024-08-14 | End: 2024-08-14

## 2024-08-14 RX ADMIN — IOPAMIDOL 75 ML: 755 INJECTION, SOLUTION INTRAVENOUS at 18:04

## 2024-08-14 RX ADMIN — SODIUM CHLORIDE 1000 ML: 9 INJECTION, SOLUTION INTRAVENOUS at 18:53

## 2024-08-14 RX ADMIN — Medication 80 ML: at 18:04

## 2024-08-14 RX ADMIN — AMOXICILLIN AND CLAVULANATE POTASSIUM 1 TABLET: 875; 125 TABLET, FILM COATED ORAL at 19:11

## 2024-08-14 RX ADMIN — Medication 80 ML: at 18:06

## 2024-08-14 ASSESSMENT — PAIN - FUNCTIONAL ASSESSMENT: PAIN_FUNCTIONAL_ASSESSMENT: 0-10

## 2024-08-14 ASSESSMENT — PAIN SCALES - GENERAL: PAINLEVEL_OUTOF10: 3

## 2024-08-14 ASSESSMENT — PAIN DESCRIPTION - LOCATION: LOCATION: ABDOMEN

## 2024-08-14 NOTE — ED PROVIDER NOTES
EMERGENCY DEPARTMENT ENCOUNTER      Pt Name: Jean Archuleta  MRN: [id number]  Birthdate 1955  Date of evaluation: 8/14/2024  Provider: Michael Guerrero PA-C    CHIEF COMPLAINT       Chief Complaint   Patient presents with    Diarrhea    Emesis    Fatigue    decreased appetite    Weight Loss     Pt arrives with co diarrhea, emesis intermittent, fatigue, decreased appetite and weight loss (12lbs in 10 days).          HISTORY OF PRESENT ILLNESS      Jean Archuleta is a 68 y.o. male who presents to the emergency department complaining of diarrhea and fatigue decreased appetite for the past week.  Patient states that the little over a week ago he was seen here in the emergency room for kidney stones.  He was discharged on antibiotics.  He states that he has been taking antibiotics but he has been having some abdominal cramping and diarrhea.  He states his appetite has decreased and had some nausea but denies any vomiting.  Denies any blood in stool.  Denies any fevers or chills        REVIEW OF SYSTEMS       Review of Systems   AS STATED IN HPI      PAST MEDICAL HISTORY     Past Medical History:   Diagnosis Date    Arthritis     Chronic kidney disease     DDD (degenerative disc disease), cervical     lumbar    Difficulty urinating     Fracture lumbar vertebra-closed (HCC)     chronic fracture to T11 T12    GERD (gastroesophageal reflux disease)     History of fracture     Sacrum, pelvis, ribs    History of stress test     x2    Hyperlipidemia     Kidney stone     Left    Neurogenic bladder     Palpitations     Thyroid disease     Hyperparathyroidism    Under care of team 06/10/2021    Pcp: Mahesh Thapa,[place], last visit 2021    Under care of team 06/10/2021    Urology: Mahesh Wilcox, last visit 2/2021    Under care of team 06/10/2021    Orthopedic: Ofe Graff, last visit 4/2021    Under care of team 06/10/2021    Ortho: , last visit 2020    Ureteral stone with hydronephrosis

## 2024-08-14 NOTE — ED PROVIDER NOTES
[facility] Emergency Department    [address]  [place] [facility] [place] [address]  Phone: [phone]  Fax: [phone]  Emergency Department  Faculty Attestation    I performed a history and physical examination of the patient and discussed management with the mid level provider. I reviewed the mid level provider's note and agree with the documented findings and plan of care. Any areas of disagreement are noted on the chart. I was personally present for the key portions of any procedures. I have documented in the chart those procedures where I was not present during the key portions. I have reviewed the emergency nurses triage note. I agree with the chief complaint, past medical history, past surgical history, allergies, medications, social and family history as documented unless otherwise noted below. Documentation of the HPI, Physical Exam and Medical Decision Making performed by medical students or scribes is based on my personal performance of the HPI, PE and MDM. For Physician Assistant/ Nurse Practitioner cases/documentation I have personally evaluated this patient and have completed at least one if not all key elements of the E/M (history, physical exam, and MDM). Additional findings are as noted.      Primary Care Physician:  Moni Brennan MD    CHIEF COMPLAINT       Chief Complaint   Patient presents with    Diarrhea    Emesis    Fatigue    decreased appetite    Weight Loss     Pt arrives with co diarrhea, emesis intermittent, fatigue, decreased appetite and weight loss (12lbs in 10 days).        RECENT VITALS:   Temp: 99.6 °F (37.6 °C),  Pulse: (!) 111, Respirations: 14, BP: (!) 145/80    LABS:  Labs Reviewed   C. DIFFICILE TOXIN MOLECULAR - Abnormal; Notable for the following components:       Result Value    C Difficile tox, pcr   (*)     Value: POSITIVE: C difficile tcdB nucleic acid detected by RT-PCR.    All other components within normal limits   CBC WITH AUTO DIFFERENTIAL -

## 2024-08-15 LAB
C DIFFICILE TOXINS, PCR: ABNORMAL
CAMPYLOBACTER DNA SPEC NAA+PROBE: NORMAL
ETEC ELTA+ESTB GENES STL QL NAA+PROBE: NORMAL
P SHIGELLOIDES DNA STL QL NAA+PROBE: NORMAL
SALMONELLA DNA SPEC QL NAA+PROBE: NORMAL
SHIGA TOXIN STX GENE SPEC NAA+PROBE: NORMAL
SHIGELLA DNA SPEC QL NAA+PROBE: NORMAL
SPECIMEN DESCRIPTION: ABNORMAL
SPECIMEN DESCRIPTION: NORMAL
V CHOL+PARA RFBL+TRKH+TNAA STL QL NAA+PR: NORMAL
Y ENTERO RECN STL QL NAA+PROBE: NORMAL

## 2024-08-15 NOTE — RESULT ENCOUNTER NOTE
L/M for pt to call back regarding results(+ for C Diff) Per Dr Amaro, d/c Augmentin- begin taking PO Vanco 125 mg QID x 10 days. Writer called Rx into [facility] in [place].

## 2024-08-16 NOTE — RESULT ENCOUNTER NOTE
Pt contacts unit- updated on results. Pt states he did not start taking Augmentin and picked up Vanco to begin Tx.

## 2024-08-18 LAB
EKG ATRIAL RATE: 83 BPM
EKG P AXIS: 53 DEGREES
EKG P-R INTERVAL: 136 MS
EKG Q-T INTERVAL: 394 MS
EKG QRS DURATION: 84 MS
EKG QTC CALCULATION (BAZETT): 462 MS
EKG R AXIS: 80 DEGREES
EKG T AXIS: 39 DEGREES
EKG VENTRICULAR RATE: 83 BPM

## (undated) DEVICE — RENTAL LASER HOLMIUM HIGH WATTAGE CASE

## (undated) DEVICE — YANKAUER,FLEXIBLE HANDLE,REGLR CAPACITY: Brand: MEDLINE INDUSTRIES, INC.

## (undated) DEVICE — STRIP,CLOSURE,WOUND,MEDI-STRIP,1/2X4: Brand: MEDLINE

## (undated) DEVICE — MAT FLOOR ULTRA ABS 28X48IN

## (undated) DEVICE — SVMMC PEDS/UROLOGY MINOR PACK: Brand: MEDLINE INDUSTRIES, INC.

## (undated) DEVICE — CATHETER URETH 16FR BLLN 5CC SIL ALLY W/ SIL HYDRGEL 2 W F

## (undated) DEVICE — DRAPE,REIN 53X77,STERILE: Brand: MEDLINE

## (undated) DEVICE — PACK PROCEDURE SURG CYSTO SVMMC LF

## (undated) DEVICE — Device

## (undated) DEVICE — TRANSFER SET 3": Brand: MEDLINE INDUSTRIES, INC.

## (undated) DEVICE — GOWN,AURORA,NONRNF,XL,30/CS: Brand: MEDLINE

## (undated) DEVICE — GUIDEWIRE ENDOSCP L150CM DIA0.038IN NIT HYDRPHLC STR TIP

## (undated) DEVICE — ASPIRATOR FLR L72IN SUCT TB W/ 1 DETACH FISH STK PUSH HNDL

## (undated) DEVICE — PLUG,CATHETER,DRAINAGE PROTECTOR,TUBE: Brand: MEDLINE

## (undated) DEVICE — PAD,ABDOMINAL,5"X9",ST,LF,25/BX: Brand: MEDLINE INDUSTRIES, INC.

## (undated) DEVICE — 3M™ IOBAN™ 2 ANTIMICROBIAL INCISE DRAPE 6650EZ: Brand: IOBAN™ 2

## (undated) DEVICE — CONTAINER,SPECIMEN,4OZ,OR STRL: Brand: MEDLINE

## (undated) DEVICE — GARMENT,MEDLINE,DVT,INT,CALF,MED, GEN2: Brand: MEDLINE

## (undated) DEVICE — KIT NEPHSTMY CATH DIA7FR LINGEMAN 0.035IN SUP STIFF J DIL

## (undated) DEVICE — CATHETER F BLLN 5CC 24FR INF CTRL 2 W SIL ALLY AND HYDRGEL

## (undated) DEVICE — 3M™ STERI-STRIP™ COMPOUND BENZOIN TINCTURE 40 BAGS/CARTON 4 CARTONS/CASE C1544: Brand: 3M™ STERI-STRIP™

## (undated) DEVICE — GLOVE ORANGE PI 8 1/2   MSG9085

## (undated) DEVICE — GAUZE,SPONGE,FLUFF,6"X6.75",STRL,5/TRAY: Brand: MEDLINE

## (undated) DEVICE — 60 ML SYRINGE LUER-LOCK TIP: Brand: MONOJECT

## (undated) DEVICE — CONNECTOR,TUBING,5-IN-1,NON-STERILE: Brand: MEDLINE INDUSTRIES, INC.

## (undated) DEVICE — ADAPTER URO SCP UROLOK LL

## (undated) DEVICE — CRANIOTOMY DRAPE, STERILE: Brand: MEDLINE

## (undated) DEVICE — PROTECTOR ULN NRV PUR FOAM HK LOOP STRP ANATOMICALLY

## (undated) DEVICE — Z DISCONTINUED USE 2624853 GLOVE SURG SZ 75 L12IN THK91MIL BRN LTX FREE

## (undated) DEVICE — GUIDEWIRE URO L150CM DIA0.035IN STIFF NIT HYDRPHLC STR TIP

## (undated) DEVICE — TOWEL,OR,DSP,ST,NATURAL,DLX,4/PK,20PK/CS: Brand: MEDLINE

## (undated) DEVICE — TUBING, SUCTION, 1/4" X 12', STRAIGHT: Brand: MEDLINE

## (undated) DEVICE — DRAPE C ARM UNIV W41XL74IN CLR PLAS XR VELC CLSR POLY STRP

## (undated) DEVICE — DUAL LUMEN URETERAL CATHETER

## (undated) DEVICE — GLOVE ORANGE PI 7 1/2   MSG9075

## (undated) DEVICE — Y-TYPE TUR/BLADDER IRRIGATION SET, REGULATING CLAMP

## (undated) DEVICE — DRAINBAG,ANTI-REFLUX TOWER,L/F,2000ML,LL: Brand: MEDLINE

## (undated) DEVICE — GARMENT COMPR STD FOR 17IN CALF UNIF THER FLOTRN

## (undated) DEVICE — SINGLE ACTION PUMPING SYSTEM

## (undated) DEVICE — TUBING, SUCTION, 9/32" X 20', STRAIGHT: Brand: MEDLINE INDUSTRIES, INC.

## (undated) DEVICE — CATCHER STONE TBNG ADPT SWISS LITHOCLAST

## (undated) DEVICE — Z DISCONTINUED NO SUB IDED TAPE UMB W1/8XL36IN WHT COT STRND NONRADIOPAQUE

## (undated) DEVICE — SHEET DRAPE FULL 70X100

## (undated) DEVICE — HERNIA PK

## (undated) DEVICE — CLIPVAC PRESURG HAIR REMOVAL

## (undated) DEVICE — BLADE CLIPPER GEN PURP NS

## (undated) DEVICE — Z DISCONTINUED BY MEDLINE USE 2711682 TRAY SKIN PREP DRY W/ PREM GLV

## (undated) DEVICE — OCCLUSION BALLOON CATHETER: Brand: OCCLUDER

## (undated) DEVICE — SKIN PREP TRAY W/CHG: Brand: MEDLINE INDUSTRIES, INC.

## (undated) DEVICE — ADHESIVE SKIN CLSR 0.7ML TOP DERMBND ADV

## (undated) DEVICE — HIGH PRESSURE NEPHROSTOMY BALLOON CATHETER KIT: Brand: NEPHROMAX KIT

## (undated) DEVICE — SUTURE ETHBND EXCEL SZ 0 L30IN NONABSORBABLE GRN CT1 L36MM X424H

## (undated) DEVICE — SOLUTION SCRB 4OZ 4% CHG H2O AIDED FOR PREOPERATIVE SKIN

## (undated) DEVICE — CATHETER URETH 18FR BLLN 5CC SIL ALLY W/ SIL HYDRGEL 2 W F

## (undated) DEVICE — TOWEL,OR,DSP,ST,BLUE,DLX,XR,4/PK,20PK/CS: Brand: MEDLINE